# Patient Record
Sex: MALE | Race: WHITE | URBAN - METROPOLITAN AREA
[De-identification: names, ages, dates, MRNs, and addresses within clinical notes are randomized per-mention and may not be internally consistent; named-entity substitution may affect disease eponyms.]

---

## 2018-02-21 PROBLEM — F20.9 SCHIZOPHRENIA (H): Status: ACTIVE | Noted: 2018-02-21

## 2018-02-21 PROBLEM — I95.1 POSTURAL HYPOTENSION: Status: ACTIVE | Noted: 2018-02-21

## 2018-02-21 PROBLEM — E27.40 ADRENAL INSUFFICIENCY (H): Status: ACTIVE | Noted: 2018-02-21

## 2018-02-21 PROBLEM — Z85.46 HISTORY OF PROSTATE CANCER: Status: ACTIVE | Noted: 2018-02-21

## 2018-02-21 PROBLEM — F03.A0 MILD DEMENTIA (H): Status: ACTIVE | Noted: 2018-02-21

## 2018-02-21 PROBLEM — G24.01 TARDIVE DYSKINESIA: Status: ACTIVE | Noted: 2018-02-21

## 2018-02-21 RX ORDER — MULTIVIT WITH MINERALS/LUTEIN
1000 TABLET ORAL EVERY MORNING
COMMUNITY

## 2018-02-21 RX ORDER — SERTRALINE HYDROCHLORIDE 100 MG/1
250 TABLET, FILM COATED ORAL AT BEDTIME
COMMUNITY

## 2018-02-21 RX ORDER — TETRABENAZINE 25 MG/1
50 TABLET ORAL 3 TIMES DAILY
COMMUNITY
End: 2018-02-25

## 2018-02-21 RX ORDER — FLUDROCORTISONE ACETATE 0.1 MG/1
0.1 TABLET ORAL DAILY
COMMUNITY

## 2018-02-21 RX ORDER — QUETIAPINE FUMARATE 100 MG/1
100 TABLET, FILM COATED ORAL AT BEDTIME
COMMUNITY

## 2018-02-21 RX ORDER — GABAPENTIN 600 MG/1
600 TABLET ORAL AT BEDTIME
COMMUNITY

## 2018-02-21 RX ORDER — CLONAZEPAM 0.5 MG/1
0.25 TABLET ORAL AT BEDTIME
COMMUNITY

## 2018-02-21 RX ORDER — ACETAMINOPHEN 325 MG/1
325-650 TABLET ORAL EVERY 4 HOURS PRN
COMMUNITY

## 2018-02-21 RX ORDER — TERAZOSIN 1 MG/1
1 CAPSULE ORAL AT BEDTIME
COMMUNITY

## 2018-02-22 ENCOUNTER — NURSING HOME VISIT (OUTPATIENT)
Dept: GERIATRICS | Facility: CLINIC | Age: 73
End: 2018-02-22

## 2018-02-22 VITALS — WEIGHT: 172 LBS

## 2018-02-22 DIAGNOSIS — K92.1 HEMATOCHEZIA: ICD-10-CM

## 2018-02-22 DIAGNOSIS — F20.9 SCHIZOPHRENIA, UNSPECIFIED TYPE (H): ICD-10-CM

## 2018-02-22 DIAGNOSIS — R53.81 PHYSICAL DECONDITIONING: ICD-10-CM

## 2018-02-22 DIAGNOSIS — R62.7 FTT (FAILURE TO THRIVE) IN ADULT: ICD-10-CM

## 2018-02-22 DIAGNOSIS — E27.40 ADRENAL INSUFFICIENCY (H): ICD-10-CM

## 2018-02-22 DIAGNOSIS — G24.01 TARDIVE DYSKINESIA: ICD-10-CM

## 2018-02-22 DIAGNOSIS — D64.89 ANEMIA DUE TO RADIATION: Primary | ICD-10-CM

## 2018-02-22 DIAGNOSIS — I95.1 ORTHOSTATIC HYPOTENSION: ICD-10-CM

## 2018-02-22 PROCEDURE — 99310 SBSQ NF CARE HIGH MDM 45: CPT | Performed by: NURSE PRACTITIONER

## 2018-02-22 NOTE — PROGRESS NOTES
[place] [facility]    PRIMARY CARE PROVIDER AND CLINIC:  No Ref-Primary, Physician     Chief Complaint   Patient presents with     Hospital F/U       HPI:    Ralph Wilcox is a 72 year old  (1945),admitted to the [facility] from [facility].  Hospital stay 2/10/2018 through 2/21/2018.  Admitted to this facility for  rehab, medical management and nursing care.  HPI information obtained from: facility chart records, facility staff and patient report.       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 S/p 3u pRBC on admission. S/p EGD and colonoscopy on 2/13: EGD with non-bleeding small ulcers and colonoscopy with radiation proctopathy, s/p Sigmoidoscopy aid RFA for slow oozing angioectasis. Pt continues to have intermittent bloody stools since the RFA and a steady decline in his hgb to 7.7 and was prophylactically transfused with 1 u of pRBC with appropriate response. On discharge hgb is 9.5. GI contacted one day  prior to discharge about intermitent bloody stools and steady decline in hgb aid suggested slow bleeding is expected and that pt may require out-patient blood transfusions as needed until he follows up with then in 4 weeks for possible Sigmoidoscopy with RFA. On discharge pt tolerating regular diet and no c/a of bloody stools for 24 hrs.  - hgb chick in 1 week at TCU and transfusion of pRBC if '7  - follow up with PCP in 1 week  - follow up with GI in 4 weeks as stated above    2)Failurå to Thrive: pressented with c/o of inability to independatly preform ADLs, Progressively worsening self cares noted by HHN. Also noted prior  concerns of worsening dementia per chart review. Pt is decisional at this point and would like to return home but amenable to acute  rehab. On discharge pt endorsed good appetite and increased mobility.  - could benefit from IT evaluation for dementia by PCP  Currently being discharged to [facility] for acute rehab    3) Orthostatic hypotension with Chronic Dizziness: pt continues to have chronic dizziness leading to instability and repeated falls at home. During his hospital stay he had variable BP ranging from SBP in low 1ß0s to 150s. All his antihypertensive medications were held and recommend discontinuing them indefinitely. Pt is able to walk with a walker and requires one on one assit  - discontinue chlorthalidone indefinitely  - discontinue tamsulosin indefinitely and start prazosin as it has lower  insidents of hypotension  - cont previous FLUDROCORTISONE 0.1 mg qam    4)Tardivå dyskinesia: n admission c/a worsening dysarthria over the  past 6 months, confirmed by brother present in the exam room. Admission neuroexam was negative for any abnormalities. Pt is on home terbanefine for tardivdyskinesia. Unclear if pt takes his medication daily given FT. Endorses improvement of sx on discharge. It would be imperative for pt to receive his daily medication as prescribed.    5) Social problems: pt lives at home alone and may not have a running water and functional toilet, unclear if home is safe for inhabitation.  is repeatedly endorsed about going home after a short-term rehab and not amenable to living in a long term facility. SW recommended [place] , inspect the home and of fere resources to assist with cleanup and repair.    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  Hematochezia  As noted above  No further bloody stools since admission per patient report  Last hemoglobin levels as noted below  Follow-up as noted above  Vital signs have remained stable    FTT (failure to thrive) in adult  As noted above  Wt Readings from Last 4 Encounters:   02/22/18 172 lb (78 kg)     Pan  dyskinesia  Chronic  Terbinafine restarted inpatient  Patient denies over exaggerated symptoms of tardive dyskinesia at this time    Orthostatic hypotension  Blood pressures have remained stable since arrival to facility    Schizophrenia, unspecified type (H)  Chronic  On regimen of Zoloft Seroquel and Klonopin  No behaviors noted since arrival to facility    Adrenal insufficiency (H)  Chronic  Continues on regimen of Florinef    Physical deconditioning  Secondary to hospitalization and above-noted diagnoses  PT/OT following    CODE STATUS/ADVANCE DIRECTIVES DISCUSSION:   DNR / DNI  Patient's living condition: lives alone    ALLERGIES:No known allergies  PAST MEDICAL HISTORY:  has no past medical history on file.  PAST SURGICAL HISTORY:  has no past surgical history on file.  FAMILY HISTORY: family history is not on file.  SOCIAL HISTORY:      Post Discharge Medication Reconciliation Status: discharge medications reconciled, continue medications without change.  Current Outpatient Prescriptions   Medication Sig Dispense Refill     acetaminophen (TYLENOL) 325 MG tablet Take 325-650 mg by mouth every 4 hours as needed       clonazePAM (KLONOPIN) 0.5 MG tablet Take 0.25 mg by mouth At Bedtime       fludrocortisone (FLORINEF) 0.1 MG tablet Take 0.1 mg by mouth daily       gabapentin (NEURONTIN) 600 MG tablet Take 600 mg by mouth At Bedtime       QUEtiapine (SEROQUEL) 100 MG tablet Take 100 mg by mouth At Bedtime       terazosin (HYTRIN) 1 MG capsule Take 1 mg by mouth At Bedtime       tetrabenazine (XENAZINE) 25 MG tablet Take 50 mg by mouth 3 times daily       vitamin E (TOCOPHEROL) 1000 UNIT capsule Take 1,000 Units by mouth every morning       sertraline (ZOLOFT) 100 MG tablet Take 250 mg by mouth At Bedtime         ROS:  10 point ROS of systems including Constitutional, Eyes, Respiratory, Cardiovascular, Gastroenterology, Genitourinary, Integumentary, Muscularskeletal, Psychiatric were all negative except for  pertinent positives noted in my HPI.    Exam:  Wt 172 lb (78 kg)  GENERAL APPEARANCE:  Alert, in no distress, oriented, thin, cooperative  ENT:  Mouth and posterior oropharynx normal, moist mucous membranes, [place], edentulous  EYES:  EOM, conjunctivae, lids, pupils and irises normal, wears glasses  NECK:  No adenopathy,masses or thyromegaly  RESP:  respiratory effort and palpation of chest normal, lungs clear to auscultation , no respiratory distress  CV:  Palpation and auscultation of heart done , regular rate and rhythm, no murmur, rub, or gallop, no edema, +2 pedal pulses  ABDOMEN:  normal bowel sounds, soft, nontender, no hepatosplenomegaly or other masses  M/S:   Gait and station abnormal - ANTONIETA 2/2 patient beingin bed  Digits and nails normal  SKIN:  Palpation of skin and subcutaneous tissue baseline, Inspection of skin and subcutaneous tissue numerous areas on bruising throughout extremities, skin thin and dry  NEURO:   Cranial nerves 2-12 are normal tested and grossly at patient's baseline  PSYCH:  oriented X 3, insight and judgement impaired, affect and mood normal, may be slightly forgetful - doesn't say much during conversation - unsure of baseline    Lab/Diagnostic data:  No recent Labs       ASSESSMENT/PLAN:   Diagnosis Comments   1. 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 FTT (failure to thrive) in adult   dietary involvement  We will notice for discharge planning   3. Tardive dyskinesia   chronic  Stable on current regimen; continue medications as currently ordered.   4. Orthostatic hypotension   will continue to monitor while in facility  Stable without medical intervention   5. Adrenal insufficiency (H)   Stable on current regimen; continue medications as currently ordered.   6. Schizophrenia, unspecified type (H)   Stable on current regimen; continue medications  as currently ordered.   7. Physical deconditioning   PT/OT eval/treat advanced per their recommendations     Electronically signed by:  CARMELLA Rojas CNP

## 2018-02-22 NOTE — LETTER
2/22/2018        RE: Ralph Wilcox  [address] [address]        [place] [facility]    PRIMARY CARE PROVIDER AND CLINIC:  No Ref-Primary, Physician     Chief Complaint   Patient presents with     Hospital F/U       HPI:    Ralph Wilcox is a 72 year old  (1945),admitted to the [facility] from [facility].  Hospital stay 2/10/2018 through 2/21/2018.  Admitted to this facility for  rehab, medical management and nursing care.  HPI information obtained from: facility chart records, facility staff and patient report.       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 S/p 3u pRBC on admission. S/p EGD and colonoscopy on 2/13: EGD with non-bleeding small ulcers and colonoscopy with radiation proctopathy, s/p Sigmoidoscopy aid RFA for slow oozing angioectasis. Pt continues to have intermittent bloody stools since the RFA and a steady decline in his hgb to 7.7 and was prophylactically transfused with 1 u of pRBC with appropriate response. On discharge hgb is 9.5. GI contacted one day  prior to discharge about intermitent bloody stools and steady decline in hgb aid suggested slow bleeding is expected and that pt may require out-patient blood transfusions as needed until he follows up with then in 4 weeks for possible Sigmoidoscopy with RFA. On discharge pt tolerating regular diet and no c/a of bloody stools for 24 hrs.  - hgb chick in 1 week at TCU and transfusion of pRBC if '7  - follow up with PCP in 1 week  - follow up with GI in 4 weeks as stated above    2)Failurå to Thrive: pressented with c/o of inability to independatly preform ADLs, Progressively worsening self cares noted by HHN. Also noted prior  concerns of worsening dementia per  chart review. Pt is decisional at this point and would like to return home but amenable to acute rehab. On discharge pt endorsed good appetite and increased mobility.  - could benefit from IT evaluation for dementia by PCP  Currently being discharged to [facility] for acute rehab    3) Orthostatic hypotension with Chronic Dizziness: pt continues to have chronic dizziness leading to instability and repeated falls at home. During his hospital stay he had variable BP ranging from SBP in low 1ß0s to 150s. All his antihypertensive medications were held and recommend discontinuing them indefinitely. Pt is able to walk with a walker and requires one on one assit  - discontinue chlorthalidone indefinitely  - discontinue tamsulosin indefinitely and start prazosin as it has lower  insidents of hypotension  - cont previous FLUDROCORTISONE 0.1 mg qam    4)Tardivå dyskinesia: n admission c/a worsening dysarthria over the  past 6 months, confirmed by brother present in the exam room. Admission neuroexam was negative for any abnormalities. Pt is on home terbanefine for tardivdyskinesia. Unclear if pt takes his medication daily given FT. Endorses improvement of sx on discharge. It would be imperative for pt to receive his daily medication as prescribed.    5) Social problems: pt lives at home alone and may not have a running water and functional toilet, unclear if home is safe for inhabitation.  is repeatedly endorsed about going home after a short-term rehab and not amenable to living in a long term facility. [place] recommended [place] , inspect the home and of fere resources to assist with cleanup and repair.    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  Hematochezia  As noted above  No further bloody stools since admission per patient report  Last hemoglobin levels as noted below  Follow-up as noted above  Vital signs have remained stable    FTT (failure to thrive) in  adult  As noted above  Wt Readings from Last 4 Encounters:   02/22/18 172 lb (78 kg)     Tardive dyskinesia  Chronic  Terbinafine restarted inpatient  Patient denies over exaggerated symptoms of tardive dyskinesia at this time    Orthostatic hypotension  Blood pressures have remained stable since arrival to facility    Schizophrenia, unspecified type (H)  Chronic  On regimen of Zoloft Seroquel and Klonopin  No behaviors noted since arrival to facility    Adrenal insufficiency (H)  Chronic  Continues on regimen of Florinef    Physical deconditioning  Secondary to hospitalization and above-noted diagnoses  PT/OT following    CODE STATUS/ADVANCE DIRECTIVES DISCUSSION:   DNR / DNI  Patient's living condition: lives alone    ALLERGIES:No known allergies  PAST MEDICAL HISTORY:  has no past medical history on file.  PAST SURGICAL HISTORY:  has no past surgical history on file.  FAMILY HISTORY: family history is not on file.  SOCIAL HISTORY:      Post Discharge Medication Reconciliation Status: discharge medications reconciled, continue medications without change.  Current Outpatient Prescriptions   Medication Sig Dispense Refill     acetaminophen (TYLENOL) 325 MG tablet Take 325-650 mg by mouth every 4 hours as needed       clonazePAM (KLONOPIN) 0.5 MG tablet Take 0.25 mg by mouth At Bedtime       fludrocortisone (FLORINEF) 0.1 MG tablet Take 0.1 mg by mouth daily       gabapentin (NEURONTIN) 600 MG tablet Take 600 mg by mouth At Bedtime       QUEtiapine (SEROQUEL) 100 MG tablet Take 100 mg by mouth At Bedtime       terazosin (HYTRIN) 1 MG capsule Take 1 mg by mouth At Bedtime       tetrabenazine (XENAZINE) 25 MG tablet Take 50 mg by mouth 3 times daily       vitamin E (TOCOPHEROL) 1000 UNIT capsule Take 1,000 Units by mouth every morning       sertraline (ZOLOFT) 100 MG tablet Take 250 mg by mouth At Bedtime         ROS:  10 point ROS of systems including Constitutional, Eyes, Respiratory, Cardiovascular,  Gastroenterology, Genitourinary, Integumentary, Muscularskeletal, Psychiatric were all negative except for pertinent positives noted in my HPI.    Exam:  Wt 172 lb (78 kg)  GENERAL APPEARANCE:  Alert, in no distress, oriented, thin, cooperative  ENT:  Mouth and posterior oropharynx normal, moist mucous membranes, [place], edentulous  EYES:  EOM, conjunctivae, lids, pupils and irises normal, wears glasses  NECK:  No adenopathy,masses or thyromegaly  RESP:  respiratory effort and palpation of chest normal, lungs clear to auscultation , no respiratory distress  CV:  Palpation and auscultation of heart done , regular rate and rhythm, no murmur, rub, or gallop, no edema, +2 pedal pulses  ABDOMEN:  normal bowel sounds, soft, nontender, no hepatosplenomegaly or other masses  M/S:   Gait and station abnormal - ANTONIETA 2/2 patient beingin bed  Digits and nails normal  SKIN:  Palpation of skin and subcutaneous tissue baseline, Inspection of skin and subcutaneous tissue numerous areas on bruising throughout extremities, skin thin and dry  NEURO:   Cranial nerves 2-12 are normal tested and grossly at patient's baseline  PSYCH:  oriented X 3, insight and judgement impaired, affect and mood normal, may be slightly forgetful - doesn't say much during conversation - unsure of baseline    Lab/Diagnostic data:  No recent Labs       ASSESSMENT/PLAN:   Diagnosis Comments   1. 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 FTT (failure to thrive) in adult   dietary involvement  We will notice for discharge planning   3. Tardive dyskinesia   chronic  Stable on current regimen; continue medications as currently ordered.   4. Orthostatic hypotension   will continue to monitor while in facility  Stable without medical intervention   5. Adrenal insufficiency (H)   Stable on current regimen; continue medications as  currently ordered.   6. Schizophrenia, unspecified type (H)   Stable on current regimen; continue medications as currently ordered.   7. Physical deconditioning   PT/OT eval/treat advanced per their recommendations     Electronically signed by:  CARMELLA Rojas CNP                    Sincerely,        CARMELLA Rojas CNP

## 2018-02-25 VITALS
SYSTOLIC BLOOD PRESSURE: 134 MMHG | OXYGEN SATURATION: 97 % | TEMPERATURE: 97.9 F | DIASTOLIC BLOOD PRESSURE: 72 MMHG | WEIGHT: 172 LBS | RESPIRATION RATE: 18 BRPM | HEART RATE: 79 BPM

## 2018-02-25 NOTE — PROGRESS NOTES
"[place] [facility]  INITIAL VISIT NOTE  February 26, 2018    PRIMARY CARE PROVIDER AND CLINIC:  No Ref-Primary, Physician     Chief Complaint   Patient presents with     Hospital F/U       HPI:    Ralph Wilcox is a 72 year old  (1945) male who [facility] February 26, 2018 for an initial visit. Medical history is notable for schizophrenia, tardive dyskinesia, orthostatic hypotension and chronic dizziness. He was hospitalized at the [facility] from 2/10/18 to 2/21/18 where he presented with hematochezia, dizziness and weakness and was found to have a lower GI bleed and anemia (Hgb 6.8 from 9.3 one month prior and baseline 12-14) for which he is s/p 3 units PRBCs. He underwent a colonoscopy (2/13/18) with multiple non-bleeding colonic angioectasias consistent with radiation proctopathy. 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 He then had a flex sigmoidoscopy (2/14/18) with angioectasis with oozing in the rectum s/p RFA.  He continued to have intermittent bloody stools and Hgb drifted to 7.7 and he received a 4th unit of PRBCs. Recommendation for repeat flex ig in 4 weeks for retreatment and additional PRBC transfusion for Hgb <7. His PTA chlorthalidone, tamsulosin and potassium were discontinued, PTA gabapentin was changed from morning to bedtime dosing and he is new on prazosin (though discharge med list still has tamsulosin).  He was admitted to this facility for medical management and rehab.     Today, Mr. Wilcox is seen on a break between therapies. Reports some intermittent rectal bleeding he describes as \"small clots\" - no carlie red blood. No abdominal pain. No dizziness or lightheadedness. No chest pain. Working with therapies.     CODE STATUS:   DNR / DNI    ALLERGIES:     Allergies   Allergen Reactions     No Known Allergies        PAST MEDICAL HISTORY:   schizophrenia, tardive dyskinesia and chronic " "dizziness    PAST SURGICAL HISTORY:   No past surgical history on file.    FAMILY HISTORY:   Reviewed and non-contributory    SOCIAL HISTORY:   Lives alone     MEDICATIONS:  Current Outpatient Prescriptions   Medication Sig Dispense Refill     acetaminophen (TYLENOL) 325 MG tablet Take 325-650 mg by mouth every 4 hours as needed       clonazePAM (KLONOPIN) 0.5 MG tablet Take 0.25 mg by mouth At Bedtime       fludrocortisone (FLORINEF) 0.1 MG tablet Take 0.1 mg by mouth daily       gabapentin (NEURONTIN) 600 MG tablet Take 600 mg by mouth At Bedtime       QUEtiapine (SEROQUEL) 100 MG tablet Take 100 mg by mouth At Bedtime       terazosin (HYTRIN) 1 MG capsule Take 1 mg by mouth At Bedtime       vitamin E (TOCOPHEROL) 1000 UNIT capsule Take 1,000 Units by mouth every morning       sertraline (ZOLOFT) 100 MG tablet Take 250 mg by mouth At Bedtime         Post Discharge Medication Reconciliation Status: medication reconcilation previously completed during another office visit.    ROS:  10 point ROS neg other than the symptoms noted above in the HPI.    PHYSICAL EXAM:  /72  Pulse 79  Temp 97.9  F (36.6  C)  Resp 18  Wt 172 lb (78 kg)  SpO2 97%  Gen: sitting in wheelchair, alert, cooperative and in no acute distress  HEENT: normocephalic; oropharynx clear; thyroid not enlarged  Card: RRR, S1, S2, no murmurs  Resp: lungs clear to auscultation bilaterally  GI: abdomen soft, not-tender  MSK: normal muscle tone, no LE edema  Neuro: CX II-XII grossly in tact; ROM in all four extremities grossly in tact; + tardive dyskinsia   Psych: alert and oriented x3; normal affect    LABORATORY/IMAGING DATA:  Reviewed as per Epic    ASSESSMENT/PLAN:    GI Bleed s/p RFP for Angioectasis/Radiation Proctopathy (2/14/18)  Reports some intermittent rectal bleeding he describes as \"small clots\" - no carlie red blood. No abdominal pain.   -- repeat flex sig in mid-March for repeat treatment  -- follow Hgb (see below)    Acute Blood Loss " Anemia  Secondary to above. No evidence of ongoing bleeding. Received total of 4 units PRBCs with jodie Hgb 6.8. Hgb was 9.5 at hospital discharge. No dizziness or lightheadedness  -- repeat Hgb to ensure stability; will need PRBC transfusion for Hgb <7    Orthostatic Hypotension  Chronic. Discharge summary states PTA tamsulosin changed to prazosin, but he was discharged on tamsulosin. PTA chlorthalidone was discontinued. No SBP trend available for review (only one value in Matrix)  -- continues on fludrocortisone 0.1 mg daily  -- follow clinically     Schizophrenia  Tardive Dyskinesia  Appears compensated.   -- continues on clonazepam 0.25 mg qhs, quetiapine 100 mg qhs and sertraline 250 mg qhs  -- supportive cares    BPH  -- continues on terazosin 1 mg qhs    Physical Deconditioning  Frequent Falls  Failure to Thrive  In setting of hospitalization and underlying medical conditions  -- ongoing PT/OT    Electronically signed by  Stephanie Coronel MD

## 2018-02-26 ENCOUNTER — NURSING HOME VISIT (OUTPATIENT)
Dept: GERIATRICS | Facility: CLINIC | Age: 73
End: 2018-02-26

## 2018-02-26 DIAGNOSIS — R29.6 FALLS FREQUENTLY: ICD-10-CM

## 2018-02-26 DIAGNOSIS — R53.81 PHYSICAL DECONDITIONING: ICD-10-CM

## 2018-02-26 DIAGNOSIS — R62.7 ADULT FAILURE TO THRIVE: ICD-10-CM

## 2018-02-26 DIAGNOSIS — F20.9 SCHIZOPHRENIA, UNSPECIFIED TYPE (H): ICD-10-CM

## 2018-02-26 DIAGNOSIS — I95.1 ORTHOSTATIC HYPOTENSION: ICD-10-CM

## 2018-02-26 DIAGNOSIS — K92.2 GASTROINTESTINAL HEMORRHAGE, UNSPECIFIED GASTROINTESTINAL HEMORRHAGE TYPE: Primary | ICD-10-CM

## 2018-02-26 DIAGNOSIS — D62 ANEMIA DUE TO BLOOD LOSS, ACUTE: ICD-10-CM

## 2018-02-26 DIAGNOSIS — G24.01 TARDIVE DYSKINESIA: ICD-10-CM

## 2018-02-26 PROCEDURE — 99306 1ST NF CARE HIGH MDM 50: CPT | Performed by: INTERNAL MEDICINE

## 2018-02-28 ENCOUNTER — TRANSFERRED RECORDS (OUTPATIENT)
Dept: HEALTH INFORMATION MANAGEMENT | Facility: CLINIC | Age: 73
End: 2018-02-28

## 2018-02-28 LAB
DIFFERENTIAL: ABNORMAL
ERYTHROCYTE [DISTWIDTH] IN BLOOD BY AUTOMATED COUNT: 16.2 % (ref 11–15)
HCT VFR BLD AUTO: 29.6 % (ref 39–51)
HEMOGLOBIN: 9.3 G/DL (ref 13.4–17.5)
MCV RBC AUTO: 81.1 FL (ref 80–100)
PLATELET # BLD AUTO: 333 K/CMM (ref 140–450)
RBC # BLD AUTO: 3.65 M/CMM (ref 4.2–5.9)
WBC # BLD AUTO: 5.3 K/CMM (ref 3.8–11)

## 2018-03-01 PROBLEM — D64.89 ANEMIA DUE TO RADIATION: Status: ACTIVE | Noted: 2018-03-01

## 2018-03-01 PROBLEM — R62.7 FTT (FAILURE TO THRIVE) IN ADULT: Status: ACTIVE | Noted: 2018-03-01

## 2018-03-08 ENCOUNTER — NURSING HOME VISIT (OUTPATIENT)
Dept: GERIATRICS | Facility: CLINIC | Age: 73
End: 2018-03-08

## 2018-03-08 VITALS
WEIGHT: 172.5 LBS | DIASTOLIC BLOOD PRESSURE: 68 MMHG | OXYGEN SATURATION: 98 % | HEART RATE: 78 BPM | RESPIRATION RATE: 18 BRPM | SYSTOLIC BLOOD PRESSURE: 108 MMHG | TEMPERATURE: 97.7 F

## 2018-03-08 DIAGNOSIS — R53.81 PHYSICAL DECONDITIONING: ICD-10-CM

## 2018-03-08 DIAGNOSIS — I95.1 ORTHOSTATIC HYPOTENSION: ICD-10-CM

## 2018-03-08 DIAGNOSIS — E27.40 ADRENAL INSUFFICIENCY (H): ICD-10-CM

## 2018-03-08 DIAGNOSIS — F20.9 SCHIZOPHRENIA, UNSPECIFIED TYPE (H): ICD-10-CM

## 2018-03-08 DIAGNOSIS — D62 ANEMIA DUE TO BLOOD LOSS, ACUTE: Primary | ICD-10-CM

## 2018-03-08 DIAGNOSIS — G24.01 TARDIVE DYSKINESIA: ICD-10-CM

## 2018-03-08 DIAGNOSIS — R62.7 FTT (FAILURE TO THRIVE) IN ADULT: ICD-10-CM

## 2018-03-08 DIAGNOSIS — K92.1 HEMATOCHEZIA: ICD-10-CM

## 2018-03-08 PROCEDURE — 99309 SBSQ NF CARE MODERATE MDM 30: CPT | Performed by: NURSE PRACTITIONER

## 2018-03-08 NOTE — PROGRESS NOTES
[place] [facility]    Chief Complaint   Patient presents with     RECHECK       HPI:    Ralph Wilcox is a 72 year old  (1945), who is being seen today for an episodic care visit at [facility] [place].  HPI information obtained from: facility chart records, facility staff and patient report.    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 S/p 3u pRBC on admission. S/p EGD and colonoscopy on 2/13: EGD with non-bleeding small ulcers and colonoscopy with radiation proctopathy, s/p Sigmoidoscopy aid RFA for slow oozing angioectasis. Pt continues to have intermittent bloody stools since the RFA and a steady decline in his hgb to 7.7 and was prophylactically transfused with 1 u of pRBC with appropriate response. On discharge hgb is 9.5. GI contacted one day  prior to discharge about intermitent bloody stools and steady decline in hgb aid suggested slow bleeding is expected and that pt may require out-patient blood transfusions as needed until he follows up with then in 4 weeks for possible Sigmoidoscopy with RFA. On discharge pt tolerating regular diet and no c/a of bloody stools for 24 hrs.  - hgb chick in 1 week at TCU and transfusion of pRBC if '7  - follow up with PCP in 1 week  - follow up with GI in 4 weeks as stated above  No further bloody stools since admission per patient report  Last hemoglobin levels as noted below  Follow-up as noted above  Vital signs have remained stable   Ref. Range 2/28/2018 00:00   Hemoglobin Latest Ref Range: 13.4 - 17.5 g/dL 9.3 (L)       FTT (failure to thrive) in adult  Failur? to Thrive: pressented with c/o of inability to independatly preform ADLs, Progressively worsening self cares noted by HHN. Also noted prior  concerns of worsening  "dementia per chart review. Pt is decisional at this point and would like to return home but amenable to acute rehab. On discharge pt endorsed good appetite and increased mobility.  - could benefit from IT evaluation for dementia by PCP  Currently being discharged to [facility] for acute rehab  Wt Readings from Last 4 Encounters:   03/08/18 172 lb 8 oz (78.2 kg)   02/25/18 172 lb (78 kg)   02/22/18 172 lb (78 kg)   Weight has remained stable    Tardive dyskinesia  Chronic  Terbinafine restarted inpatient  Patient denies over exaggerated symptoms of tardive dyskinesia since admission with current regimen    Orthostatic hypotension  Blood pressures have remained stable since arrival to facility    Schizophrenia, unspecified type (H)  Chronic  On regimen of Zoloft Seroquel and Klonopin  No behaviors noted since arrival to facility  PHQ9: 08/27  Denies symptoms of depression today  BIMs: 15/15    Adrenal insufficiency (H)  Chronic  Continues on regimen of Florinef    Physical deconditioning  Secondary to hospitalization and above-noted diagnoses  PT following:  \"Attended pt's care conference and discussed c pt D/C planning, and current recommendation of assisted living.  Pt will continue c therapy to continue to improve pt's gait, balance and functional mobility\".    Ambulating with rolling walker 150 feet ×2, 15 feet ×2 with caregiver assistance    Caregiver assistance with transferring from bed to wheelchair    Independent with bed mobility    NuStep L3 resistance ×15 minutes with bilateral lower extremities and bilateral upper extremities    Seated exercises in wheelchair with 3 pound weights and GTB, 2 sets of 20 with bilateral lower extremities    Sit to stand 2 sets of 10 with standby assist    Patient has been seen for 5/5 skilled PT treatments and has demonstrated ability to ambulate 300 feet with caregiver assist, perform transfers with caregiver assist in bed mobility with independent.  Patient has " demonstrated a retro-lean at times during ambulation which patient intermittently requires assistance to correct.  Patient will continue to benefit from skilled PT to improve patient's functional mobility lower extremity strength and gait      ALLERGIES: No known allergies  Past Medical, Surgical, Family and Social History reviewed and updated in [facility].    Current Outpatient Prescriptions   Medication Sig Dispense Refill     acetaminophen (TYLENOL) 325 MG tablet Take 325-650 mg by mouth every 4 hours as needed       clonazePAM (KLONOPIN) 0.5 MG tablet Take 0.25 mg by mouth At Bedtime       fludrocortisone (FLORINEF) 0.1 MG tablet Take 0.1 mg by mouth daily       gabapentin (NEURONTIN) 600 MG tablet Take 600 mg by mouth At Bedtime       QUEtiapine (SEROQUEL) 100 MG tablet Take 100 mg by mouth At Bedtime       terazosin (HYTRIN) 1 MG capsule Take 1 mg by mouth At Bedtime       vitamin E (TOCOPHEROL) 1000 UNIT capsule Take 1,000 Units by mouth every morning       sertraline (ZOLOFT) 100 MG tablet Take 250 mg by mouth At Bedtime       Medications reviewed:  Medications reconciled to facility chart and changes were made to reflect current medications as identified as above med list. Below are the changes that were made:   Medications stopped since last EPIC medication reconciliation:   There are no discontinued medications.    Medications started since last [facility] medication reconciliation:  No orders of the defined types were placed in this encounter.    REVIEW OF SYSTEMS:  4 point ROS including Respiratory, CV, GI and , other than that noted in the HPI,  is negative    Physical Exam:  /68  Pulse 78  Temp 97.7  F (36.5  C)  Resp 18  Wt 172 lb 8 oz (78.2 kg)  SpO2 98%  BP Readin/47, 102/54, 134/72         HR:  66-79  GENERAL APPEARANCE:  Alert, in no distress, oriented, thin, cooperative  ENT:  Mouth and posterior oropharynx normal, moist mucous membranes, [place], edentulous  EYES:  EOM, conjunctivae, lids,  pupils and irises normal, wears glasses  NECK:  No adenopathy,masses or thyromegaly  RESP:  respiratory effort and palpation of chest normal, lungs clear to auscultation, no respiratory distress  CV:  Palpation and auscultation of heart done, regular rate and rhythm, no murmur, rub, or gallop, no edema, +2 pedal pulses  ABDOMEN:  normal bowel sounds, soft, nontender, no hepatosplenomegaly or other masses  M/S:   Gait and station abnormal - ANTONIETA 2/2 patient being in bed; Digits and nails normal  SKIN:  Palpation of skin and subcutaneous tissue baseline, Inspection of skin and subcutaneous tissue numerous areas on bruising throughout extremities - improved since last weeks visit, skin thin and dry  NEURO:   Cranial nerves 2-12 are normal tested and grossly at patient's baseline  PSYCH:  oriented X 3, insight and judgement impaired, affect and mood normal, may be slightly forgetful - doesn't say much during conversation - this seems to be patient baseline      Recent Labs:   CBC RESULTS:   Recent Labs   Lab Test 02/28/18   WBC  5.3   RBC  3.65*   HGB  9.3*   HCT  29.6*   MCV  81.1   RDW  16.2*   PLT  333         Assessment/Plan:   Diagnosis Comments   1. 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 FTT (failure to thrive) in adult   dietary involvement  We will monitor for discharge planning   3. Tardive dyskinesia   chronic  Stable on current regimen; continue medications as currently ordered.   4. Orthostatic hypotension   will continue to monitor while in facility  Stable without medical intervention   5. Adrenal insufficiency (H)   Stable on current regimen; continue medications as currently ordered.   6. Schizophrenia, unspecified type (H)   Stable on current regimen; continue medications as currently ordered.   7. Physical deconditioning   PT/OT - adv per their  recommendations     Electronically signed by  CARMELLA Rojas CNP

## 2018-03-08 NOTE — LETTER
3/8/2018        RE: Ralph Wilcox  [address] [address]        [place] [facility]    Chief Complaint   Patient presents with     RECHECK       HPI:    Ralph Wilcox is a 72 year old  (1945), who is being seen today for an episodic care visit at [facility].  HPI information obtained from: facility chart records, facility staff and patient report.    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 S/p 3u pRBC on admission. S/p EGD and colonoscopy on 2/13: EGD with non-bleeding small ulcers and colonoscopy with radiation proctopathy, s/p Sigmoidoscopy aid RFA for slow oozing angioectasis. Pt continues to have intermittent bloody stools since the RFA and a steady decline in his hgb to 7.7 and was prophylactically transfused with 1 u of pRBC with appropriate response. On discharge hgb is 9.5. GI contacted one day  prior to discharge about intermitent bloody stools and steady decline in hgb aid suggested slow bleeding is expected and that pt may require out-patient blood transfusions as needed until he follows up with then in 4 weeks for possible Sigmoidoscopy with RFA. On discharge pt tolerating regular diet and no c/a of bloody stools for 24 hrs.  - hgb chick in 1 week at TCU and transfusion of pRBC if '7  - follow up with PCP in 1 week  - follow up with GI in 4 weeks as stated above  No further bloody stools since admission per patient report  Last hemoglobin levels as noted below  Follow-up as noted above  Vital signs have remained stable   Ref. Range 2/28/2018 00:00   Hemoglobin Latest Ref Range: 13.4 - 17.5 g/dL 9.3 (L)       FTT (failure to thrive) in adult  Failur? to Thrive: pressented with c/o of inability to independatly preform  "ADLs, Progressively worsening self cares noted by HHN. Also noted prior  concerns of worsening dementia per chart review. Pt is decisional at this point and would like to return home but amenable to acute rehab. On discharge pt endorsed good appetite and increased mobility.  - could benefit from IT evaluation for dementia by PCP  Currently being discharged to [facility]CU for acute rehab  Wt Readings from Last 4 Encounters:   03/08/18 172 lb 8 oz (78.2 kg)   02/25/18 172 lb (78 kg)   02/22/18 172 lb (78 kg)   Weight has remained stable    Tardive dyskinesia  Chronic  Terbinafine restarted inpatient  Patient denies over exaggerated symptoms of tardive dyskinesia since admission with current regimen    Orthostatic hypotension  Blood pressures have remained stable since arrival to facility    Schizophrenia, unspecified type (H)  Chronic  On regimen of Zoloft Seroquel and Klonopin  No behaviors noted since arrival to facility  PHQ9: 08/27  Denies symptoms of depression today  BIMs: 15/15    Adrenal insufficiency (H)  Chronic  Continues on regimen of Florinef    Physical deconditioning  Secondary to hospitalization and above-noted diagnoses  PT following:  \"Attended pt's care conference and discussed c pt D/C planning, and current recommendation of assisted living.  Pt will continue c therapy to continue to improve pt's gait, balance and functional mobility\".    Ambulating with rolling walker 150 feet ×2, 15 feet ×2 with caregiver assistance    Caregiver assistance with transferring from bed to wheelchair    Independent with bed mobility    NuStep L3 resistance ×15 minutes with bilateral lower extremities and bilateral upper extremities    Seated exercises in wheelchair with 3 pound weights and GTB, 2 sets of 20 with bilateral lower extremities    Sit to stand 2 sets of 10 with standby assist    Patient has been seen for 5/5 skilled PT treatments and has demonstrated ability to ambulate 300 feet with caregiver " assist, perform transfers with caregiver assist in bed mobility with independent.  Patient has demonstrated a retro-lean at times during ambulation which patient intermittently requires assistance to correct.  Patient will continue to benefit from skilled PT to improve patient's functional mobility lower extremity strength and gait      ALLERGIES: No known allergies  Past Medical, Surgical, Family and Social History reviewed and updated in [facility].    Current Outpatient Prescriptions   Medication Sig Dispense Refill     acetaminophen (TYLENOL) 325 MG tablet Take 325-650 mg by mouth every 4 hours as needed       clonazePAM (KLONOPIN) 0.5 MG tablet Take 0.25 mg by mouth At Bedtime       fludrocortisone (FLORINEF) 0.1 MG tablet Take 0.1 mg by mouth daily       gabapentin (NEURONTIN) 600 MG tablet Take 600 mg by mouth At Bedtime       QUEtiapine (SEROQUEL) 100 MG tablet Take 100 mg by mouth At Bedtime       terazosin (HYTRIN) 1 MG capsule Take 1 mg by mouth At Bedtime       vitamin E (TOCOPHEROL) 1000 UNIT capsule Take 1,000 Units by mouth every morning       sertraline (ZOLOFT) 100 MG tablet Take 250 mg by mouth At Bedtime       Medications reviewed:  Medications reconciled to facility chart and changes were made to reflect current medications as identified as above med list. Below are the changes that were made:   Medications stopped since last EPIC medication reconciliation:   There are no discontinued medications.    Medications started since last [facility] medication reconciliation:  No orders of the defined types were placed in this encounter.    REVIEW OF SYSTEMS:  4 point ROS including Respiratory, CV, GI and , other than that noted in the HPI,  is negative    Physical Exam:  /68  Pulse 78  Temp 97.7  F (36.5  C)  Resp 18  Wt 172 lb 8 oz (78.2 kg)  SpO2 98%  BP Readin/47, 102/54, 134/72         HR:  66-79  GENERAL APPEARANCE:  Alert, in no distress, oriented, thin, cooperative  ENT:  Mouth and  posterior oropharynx normal, moist mucous membranes, [place], edentulous  EYES:  EOM, conjunctivae, lids, pupils and irises normal, wears glasses  NECK:  No adenopathy,masses or thyromegaly  RESP:  respiratory effort and palpation of chest normal, lungs clear to auscultation, no respiratory distress  CV:  Palpation and auscultation of heart done, regular rate and rhythm, no murmur, rub, or gallop, no edema, +2 pedal pulses  ABDOMEN:  normal bowel sounds, soft, nontender, no hepatosplenomegaly or other masses  M/S:   Gait and station abnormal - ANTONIETA 2/2 patient being in bed; Digits and nails normal  SKIN:  Palpation of skin and subcutaneous tissue baseline, Inspection of skin and subcutaneous tissue numerous areas on bruising throughout extremities - improved since last weeks visit, skin thin and dry  NEURO:   Cranial nerves 2-12 are normal tested and grossly at patient's baseline  PSYCH:  oriented X 3, insight and judgement impaired, affect and mood normal, may be slightly forgetful - doesn't say much during conversation - this seems to be patient baseline      Recent Labs:   CBC RESULTS:   Recent Labs   Lab Test 02/28/18   WBC  5.3   RBC  3.65*   HGB  9.3*   HCT  29.6*   MCV  81.1   RDW  16.2*   PLT  333         Assessment/Plan:   Diagnosis Comments   1. 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 FTT (failure to thrive) in adult   dietary involvement  We will monitor for discharge planning   3. Tardive dyskinesia   chronic  Stable on current regimen; continue medications as currently ordered.   4. Orthostatic hypotension   will continue to monitor while in facility  Stable without medical intervention   5. Adrenal insufficiency (H)   Stable on current regimen; continue medications as currently ordered.   6. Schizophrenia, unspecified type (H)   Stable on current regimen;  continue medications as currently ordered.   7. Physical deconditioning   PT/OT - adv per their recommendations     Electronically signed by  CARMELLA Rojas CNP                    Sincerely,        CARMELLA Rojas CNP

## 2018-03-09 ENCOUNTER — TRANSFERRED RECORDS (OUTPATIENT)
Dept: HEALTH INFORMATION MANAGEMENT | Facility: CLINIC | Age: 73
End: 2018-03-09

## 2018-03-09 LAB — HEMOGLOBIN: 9.2 G/DL (ref 13.4–17.5)

## 2018-03-14 VITALS
WEIGHT: 172.1 LBS | OXYGEN SATURATION: 98 % | HEIGHT: 74 IN | RESPIRATION RATE: 18 BRPM | BODY MASS INDEX: 22.09 KG/M2 | HEART RATE: 88 BPM | DIASTOLIC BLOOD PRESSURE: 64 MMHG | TEMPERATURE: 98.1 F | SYSTOLIC BLOOD PRESSURE: 112 MMHG

## 2018-03-15 ENCOUNTER — NURSING HOME VISIT (OUTPATIENT)
Dept: GERIATRICS | Facility: CLINIC | Age: 73
End: 2018-03-15

## 2018-03-15 DIAGNOSIS — E27.40 ADRENAL INSUFFICIENCY (H): ICD-10-CM

## 2018-03-15 DIAGNOSIS — R62.7 FTT (FAILURE TO THRIVE) IN ADULT: ICD-10-CM

## 2018-03-15 DIAGNOSIS — D64.89 ANEMIA DUE TO RADIATION: Primary | ICD-10-CM

## 2018-03-15 DIAGNOSIS — F20.9 SCHIZOPHRENIA, UNSPECIFIED TYPE (H): ICD-10-CM

## 2018-03-15 DIAGNOSIS — G24.01 TARDIVE DYSKINESIA: ICD-10-CM

## 2018-03-15 DIAGNOSIS — I95.1 ORTHOSTATIC HYPOTENSION: ICD-10-CM

## 2018-03-15 DIAGNOSIS — R53.81 PHYSICAL DECONDITIONING: ICD-10-CM

## 2018-03-15 DIAGNOSIS — K92.1 HEMATOCHEZIA: ICD-10-CM

## 2018-03-15 PROCEDURE — 99309 SBSQ NF CARE MODERATE MDM 30: CPT | Performed by: NURSE PRACTITIONER

## 2018-03-15 NOTE — LETTER
3/15/2018        RE: Ralph Wilcox  [address] [address]        [place] [facility]    Chief Complaint   Patient presents with     RECHECK       HPI:    Ralph Wilcox is a 72 year old  (1945), who is being seen today for an episodic care visit at [facility].  HPI information obtained from: facility chart records, facility staff and patient report.    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 S/p 3u pRBC on admission. S/p EGD and colonoscopy on 2/13: EGD with non-bleeding small ulcers and colonoscopy with radiation proctopathy, s/p Sigmoidoscopy aid RFA for slow oozing angioectasis. Pt continues to have intermittent bloody stools since the RFA and a steady decline in his hgb to 7.7 and was prophylactically transfused with 1 u of pRBC with appropriate response. On discharge hgb is 9.5. GI contacted one day  prior to discharge about intermitent bloody stools and steady decline in hgb aid suggested slow bleeding is expected and that pt may require out-patient blood transfusions as needed until he follows up with then in 4 weeks for possible Sigmoidoscopy with RFA. On discharge pt tolerating regular diet and no c/a of bloody stools for 24 hrs.  - hgb chick in 1 week at TCU and transfusion of pRBC if '7  - follow up with PCP in 1 week  - follow up with GI in 4 weeks as stated above  No further bloody stools since admission per patient report  Last hemoglobin levels as noted below  Follow-up as noted above  Vital signs have remained stable  Hemoglobin   Date Value Ref Range Status   03/09/2018 9.2 (L) 13.4 - 17.5 g/dL Final   02/28/2018 9.3 (L) 13.4 - 17.5 g/dL Final     FTT (failure to thrive) in adult  Failur? to Thrive: pressented with  "c/o of inability to independatly preform ADLs, Progressively worsening self cares noted by HHN. Also noted prior  concerns of worsening dementia per chart review. Pt is decisional at this point and would like to return home but amenable to acute rehab. On discharge pt endorsed good appetite and increased mobility.  - could benefit from IT evaluation for dementia by PCP  Currently being discharged to [facility] for acute rehab  Wt Readings from Last 4 Encounters:   03/14/18 172 lb 1.6 oz (78.1 kg)   03/08/18 172 lb 8 oz (78.2 kg)   02/25/18 172 lb (78 kg)   02/22/18 172 lb (78 kg)   Weight has remained stable    Tardive dyskinesia  Chronic  Terbinafine restarted inpatient  Patient denies over exaggerated symptoms of tardive dyskinesia since admission with current regimen    Orthostatic hypotension  Blood pressures have remained stable since arrival to facility    Schizophrenia, unspecified type (H)  Chronic  On regimen of Zoloft Seroquel and Klonopin  No behaviors noted since arrival to facility  PHQ9: 08/27  Denies symptoms of depression today  BIMs: 15/15    Adrenal insufficiency (H)  Chronic  Continues on regimen of Florinef    Physical deconditioning  Secondary to hospitalization and above-noted diagnoses  PT following:  \"Attended pt's care conference and discussed c pt D/C planning, and current recommendation of assisted living.  Pt will continue c therapy to continue to improve pt's gait, balance and functional mobility\".    Ambulating with rolling walker 150', 15' x2 with standby assistance    Independent with transferring from bed to wheelchair    Independent with bed mobility    NuStep L3 resistance x15 min c B LE and UE    Standing exercises in // bars, 1 set of 20 c B LE    ALLERGIES: No known allergies  Past Medical, Surgical, Family and Social History reviewed and updated in [facility].    Current Outpatient Prescriptions   Medication Sig Dispense Refill     acetaminophen (TYLENOL) 325 MG tablet " "Take 325-650 mg by mouth every 4 hours as needed       clonazePAM (KLONOPIN) 0.5 MG tablet Take 0.25 mg by mouth At Bedtime       fludrocortisone (FLORINEF) 0.1 MG tablet Take 0.1 mg by mouth daily       gabapentin (NEURONTIN) 600 MG tablet Take 600 mg by mouth At Bedtime       QUEtiapine (SEROQUEL) 100 MG tablet Take 100 mg by mouth At Bedtime       terazosin (HYTRIN) 1 MG capsule Take 1 mg by mouth At Bedtime       vitamin E (TOCOPHEROL) 1000 UNIT capsule Take 1,000 Units by mouth every morning       sertraline (ZOLOFT) 100 MG tablet Take 250 mg by mouth At Bedtime       Medications reviewed:  Medications reconciled to facility chart and changes were made to reflect current medications as identified as above med list. Below are the changes that were made:   Medications stopped since last EPIC medication reconciliation:   There are no discontinued medications.    Medications started since last [facility] medication reconciliation:  No orders of the defined types were placed in this encounter.    REVIEW OF SYSTEMS:  4 point ROS including Respiratory, CV, GI and , other than that noted in the HPI,  is negative    Physical Exam:  /64  Pulse 88  Temp 98.1  F (36.7  C)  Resp 18  Ht 6' 2\" (1.88 m)  Wt 172 lb 1.6 oz (78.1 kg)  SpO2 98%  BMI 22.1 kg/m2  BP Readin/71, 120/78, 108/68              HR:  66-88    GENERAL APPEARANCE:  Alert, in no distress, oriented, thin, cooperative, elderly male ambulating with use of front-wheeled walker  ENT:  Mouth and posterior oropharynx normal, moist mucous membranes, [place], edentulous  EYES:  EOM, conjunctivae, lids, pupils and irises normal, wears glasses  NECK:  No adenopathy, masses or thyromegaly  RESP:  respiratory effort and palpation of chest normal, lungs clear to auscultation, no respiratory distress  CV:  Palpation and auscultation of heart done, regular rate and rhythm, no murmur, rub, or gallop, no edema, +2 pedal pulses  ABDOMEN:  normal bowel sounds, soft, " nontender, no hepatosplenomegaly or other masses  M/S:   Gait and station -slow and steady with use of walker; Digits and nails normal  SKIN:  Palpation of skin and subcutaneous tissue baseline, Inspection of skin and subcutaneous tissue baseline  NEURO:   Cranial nerves 2-12 are normal tested and grossly at patient's baseline  PSYCH:  oriented X 3, insight and judgement impaired, affect and mood normal, may be slightly forgetful - doesn't say much at baseline    Recent Labs:   CBC RESULTS:   Recent Labs   Lab Test 02/28/18   WBC  5.3   RBC  3.65*   HGB  9.3*   HCT  29.6*   MCV  81.1   RDW  16.2*   PLT  333         Assessment/Plan:   Diagnosis Comments   1. 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 FTT (failure to thrive) in adult   dietary involvement  We will monitor for discharge planning   3. Tardive dyskinesia   chronic  Stable on current regimen; continue medications as currently ordered.   4. Orthostatic hypotension   will continue to monitor while in facility  Stable without medical intervention   5. Adrenal insufficiency (H)   Stable on current regimen; continue medications as currently ordered.   6. Schizophrenia, unspecified type (H)   Stable on current regimen; continue medications as currently ordered.   7. Physical deconditioning   PT/OT - adv per their recommendations       Electronically signed by  CARMELLA Rojas CNP                      Sincerely,        CARMELLA Rojas CNP

## 2018-03-15 NOTE — PROGRESS NOTES
[place] [facility]    Chief Complaint   Patient presents with     RECHECK       HPI:    Ralph Wilcox is a 72 year old  (1945), who is being seen today for an episodic care visit at [facility] [place].  HPI information obtained from: facility chart records, facility staff and patient report.    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 S/p 3u pRBC on admission. S/p EGD and colonoscopy on 2/13: EGD with non-bleeding small ulcers and colonoscopy with radiation proctopathy, s/p Sigmoidoscopy aid RFA for slow oozing angioectasis. Pt continues to have intermittent bloody stools since the RFA and a steady decline in his hgb to 7.7 and was prophylactically transfused with 1 u of pRBC with appropriate response. On discharge hgb is 9.5. GI contacted one day  prior to discharge about intermitent bloody stools and steady decline in hgb aid suggested slow bleeding is expected and that pt may require out-patient blood transfusions as needed until he follows up with then in 4 weeks for possible Sigmoidoscopy with RFA. On discharge pt tolerating regular diet and no c/a of bloody stools for 24 hrs.  - hgb chick in 1 week at TCU and transfusion of pRBC if '7  - follow up with PCP in 1 week  - follow up with GI in 4 weeks as stated above  No further bloody stools since admission per patient report  Last hemoglobin levels as noted below  Follow-up as noted above  Vital signs have remained stable  Hemoglobin   Date Value Ref Range Status   03/09/2018 9.2 (L) 13.4 - 17.5 g/dL Final   02/28/2018 9.3 (L) 13.4 - 17.5 g/dL Final     FTT (failure to thrive) in adult  Failur? to Thrive: pressented with c/o of inability to independatly preform ADLs, Progressively worsening self cares noted by HHN.  "Also noted prior  concerns of worsening dementia per chart review. Pt is decisional at this point and would like to return home but amenable to acute rehab. On discharge pt endorsed good appetite and increased mobility.  - could benefit from IT evaluation for dementia by PCP  Currently being discharged to [facility] for acute rehab  Wt Readings from Last 4 Encounters:   03/14/18 172 lb 1.6 oz (78.1 kg)   03/08/18 172 lb 8 oz (78.2 kg)   02/25/18 172 lb (78 kg)   02/22/18 172 lb (78 kg)   Weight has remained stable    Tardive dyskinesia  Chronic  Terbinafine restarted inpatient  Patient denies over exaggerated symptoms of tardive dyskinesia since admission with current regimen    Orthostatic hypotension  Blood pressures have remained stable since arrival to facility    Schizophrenia, unspecified type (H)  Chronic  On regimen of Zoloft Seroquel and Klonopin  No behaviors noted since arrival to facility  PHQ9: 08/27  Denies symptoms of depression today  BIMs: 15/15    Adrenal insufficiency (H)  Chronic  Continues on regimen of Florinef    Physical deconditioning  Secondary to hospitalization and above-noted diagnoses  PT following:  \"Attended pt's care conference and discussed c pt D/C planning, and current recommendation of assisted living.  Pt will continue c therapy to continue to improve pt's gait, balance and functional mobility\".    Ambulating with rolling walker 150', 15' x2 with standby assistance    Independent with transferring from bed to wheelchair    Independent with bed mobility    NuStep L3 resistance x15 min c B LE and UE    Standing exercises in // bars, 1 set of 20 c B LE    ALLERGIES: No known allergies  Past Medical, Surgical, Family and Social History reviewed and updated in [facility].    Current Outpatient Prescriptions   Medication Sig Dispense Refill     acetaminophen (TYLENOL) 325 MG tablet Take 325-650 mg by mouth every 4 hours as needed       clonazePAM (KLONOPIN) 0.5 MG tablet Take " "0.25 mg by mouth At Bedtime       fludrocortisone (FLORINEF) 0.1 MG tablet Take 0.1 mg by mouth daily       gabapentin (NEURONTIN) 600 MG tablet Take 600 mg by mouth At Bedtime       QUEtiapine (SEROQUEL) 100 MG tablet Take 100 mg by mouth At Bedtime       terazosin (HYTRIN) 1 MG capsule Take 1 mg by mouth At Bedtime       vitamin E (TOCOPHEROL) 1000 UNIT capsule Take 1,000 Units by mouth every morning       sertraline (ZOLOFT) 100 MG tablet Take 250 mg by mouth At Bedtime       Medications reviewed:  Medications reconciled to facility chart and changes were made to reflect current medications as identified as above med list. Below are the changes that were made:   Medications stopped since last EPIC medication reconciliation:   There are no discontinued medications.    Medications started since last [facility] medication reconciliation:  No orders of the defined types were placed in this encounter.    REVIEW OF SYSTEMS:  4 point ROS including Respiratory, CV, GI and , other than that noted in the HPI,  is negative    Physical Exam:  /64  Pulse 88  Temp 98.1  F (36.7  C)  Resp 18  Ht 6' 2\" (1.88 m)  Wt 172 lb 1.6 oz (78.1 kg)  SpO2 98%  BMI 22.1 kg/m2  BP Readin/71, 120/78, 108/68              HR:  66-88    GENERAL APPEARANCE:  Alert, in no distress, oriented, thin, cooperative, elderly male ambulating with use of front-wheeled walker  ENT:  Mouth and posterior oropharynx normal, moist mucous membranes, [place], edentulous  EYES:  EOM, conjunctivae, lids, pupils and irises normal, wears glasses  NECK:  No adenopathy, masses or thyromegaly  RESP:  respiratory effort and palpation of chest normal, lungs clear to auscultation, no respiratory distress  CV:  Palpation and auscultation of heart done, regular rate and rhythm, no murmur, rub, or gallop, no edema, +2 pedal pulses  ABDOMEN:  normal bowel sounds, soft, nontender, no hepatosplenomegaly or other masses  M/S:   Gait and station -slow and steady with " use of walker; Digits and nails normal  SKIN:  Palpation of skin and subcutaneous tissue baseline, Inspection of skin and subcutaneous tissue baseline  NEURO:   Cranial nerves 2-12 are normal tested and grossly at patient's baseline  PSYCH:  oriented X 3, insight and judgement impaired, affect and mood normal, may be slightly forgetful - doesn't say much at baseline    Recent Labs:   CBC RESULTS:   Recent Labs   Lab Test 02/28/18   WBC  5.3   RBC  3.65*   HGB  9.3*   HCT  29.6*   MCV  81.1   RDW  16.2*   PLT  333         Assessment/Plan:   Diagnosis Comments   1. 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 FTT (failure to thrive) in adult   dietary involvement  We will monitor for discharge planning   3. Tardive dyskinesia   chronic  Stable on current regimen; continue medications as currently ordered.   4. Orthostatic hypotension   will continue to monitor while in facility  Stable without medical intervention   5. Adrenal insufficiency (H)   Stable on current regimen; continue medications as currently ordered.   6. Schizophrenia, unspecified type (H)   Stable on current regimen; continue medications as currently ordered.   7. Physical deconditioning   PT/OT - adv per their recommendations       Electronically signed by  CARMELLA Rojas CNP

## 2018-03-22 NOTE — PROGRESS NOTES
[place] [facility]    Chief Complaint   Patient presents with     RECHECK       HPI:    Ralph Wilcox is a 72 year old  (1945), who is being seen today for an episodic care visit [facility] [place].  HPI information obtained from: facility chart records, facility staff and patient report.    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 S/p 3u pRBC on admission. S/p EGD and colonoscopy on 2/13: EGD with non-bleeding small ulcers and colonoscopy with radiation proctopathy, s/p Sigmoidoscopy aid RFA for slow oozing angioectasis. Pt continues to have intermittent bloody stools since the RFA and a steady decline in his hgb to 7.7 and was prophylactically transfused with 1 u of pRBC with appropriate response. On discharge hgb is 9.5. GI contacted one day  prior to discharge about intermitent bloody stools and steady decline in hgb aid suggested slow bleeding is expected and that pt may require out-patient blood transfusions as needed until he follows up with then in 4 weeks for possible Sigmoidoscopy with RFA. On discharge pt tolerating regular diet and no c/a of bloody stools for 24 hrs.  - hgb chick in 1 week at TCU and transfusion of pRBC if '7  - follow up with PCP in 1 week  - follow up with GI in 4 weeks as stated above  Patient underwent flexsig on 3/23 - post-op Hgb assessments note drop in Hgb (expected post-procedure slightly)   Ref. Range 2/28/2018 00:00 3/9/2018 00:00 3/23/2018 00:00 3/24/2018 00:00 3/25/2018 00:00 3/27/2018 00:00   Hemoglobin Latest Ref Range: 13.4 - 17.5 g/dL 9.3 (L) 9.2 (L) 8.5 (L) 8.7 (L) 8.5 (L) 7.7 (L)   Patient feeling okay today - VS have remained stable. Report for procedure noted ongoing slight bleed - had f/u with GI on 3/23, do not  have this note.  No active treatment regimen - ongoing monitoring of Hgb with transfusion parameters    FTT (failure to thrive) in adult  Failur? to Thrive: pressented with c/o of inability to independatly preform ADLs, Progressively worsening self cares noted by HHN. Also noted prior  concerns of worsening dementia per chart review. Pt is decisional at this point and would like to return home but amenable to acute rehab. On discharge pt endorsed good appetite and increased mobility.  - could benefit from IT evaluation for dementia by PCP  Currently being discharged to [facility] for acute rehab  Weight has remained stable  Wt Readings from Last 3 Encounters:   03/22/18 172 lb (78 kg)   03/14/18 172 lb 1.6 oz (78.1 kg)   03/08/18 172 lb 8 oz (78.2 kg)     Tardive dyskinesia  Chronic  Terbinafine restarted inpatient  Patient denies over exaggerated symptoms of tardive dyskinesia since admission with current regimen    Orthostatic hypotension  Blood pressures have remained stable since arrival to facility    Schizophrenia, unspecified type (H)  Chronic  On regimen of Zoloft Seroquel and Klonopin  No behaviors noted since arrival to facility  PHQ9: 08/27  Denies symptoms of depression today  BIMs: 15/15    Adrenal insufficiency (H)  Chronic  Continues on regimen of Florinef    Physical deconditioning  Secondary to hospitalization and above-noted diagnoses    PT discharged patient on 3/19: Discharge summary: patient has been seen for 18 skilled PT treatment sessions. Demonstrates the ability to ambulate independently throughout the facility. Performs transfers and bed mobility independently.  Patient has been provided with a home exercise program. Demonstrates independence and proficiency with home exercise program. Patient will be D/C'd from PT on 03/19/18. Plans for patient to move to an assisted living facility when placement is found.    Nurse manager did requested re-evalutation with fatigue/fall 2/2  "Hgb drop - completed on 3/26: Patient had a fall associated with anemia. Remains unsteady due to low HGB. Patient will continue with ambulaiton program until anemia can be resolved.     ALLERGIES: No known allergies  Past Medical, Surgical, Family and Social History reviewed and [facility] in [facility].    Current Outpatient Prescriptions   Medication Sig Dispense Refill     acetaminophen (TYLENOL) 325 MG tablet Take 325-650 mg by mouth every 4 hours as needed       clonazePAM (KLONOPIN) 0.5 MG tablet Take 0.25 mg by mouth At Bedtime       fludrocortisone (FLORINEF) 0.1 MG tablet Take 0.1 mg by mouth daily       gabapentin (NEURONTIN) 600 MG tablet Take 600 mg by mouth At Bedtime       QUEtiapine (SEROQUEL) 100 MG tablet Take 100 mg by mouth At Bedtime       terazosin (HYTRIN) 1 MG capsule Take 1 mg by mouth At Bedtime       vitamin E (TOCOPHEROL) 1000 UNIT capsule Take 1,000 Units by mouth every morning       sertraline (ZOLOFT) 100 MG tablet Take 250 mg by mouth At Bedtime       Medications reviewed:  Medications reconciled to facility chart and changes were made to reflect current medications as identified as above med list. Below are the changes that were made:   Medications stopped since last EPIC medication reconciliation:   There are no discontinued medications.    Medications started since last [facility] medication reconciliation:  No orders of the defined types were placed in this encounter.    REVIEW OF SYSTEMS:  4 point ROS including Respiratory, CV, GI and , other than that noted in the HPI,  is negative    Physical Exam:  BP (!) 144/91  Pulse 81  Temp 97.9  F (36.6  C)  Resp 18  Ht 6' 2\" (1.88 m)  Wt 172 lb (78 kg)  SpO2 98%  BMI 22.08 kg/m2  BP Readin/76, 105/80, 108/55             HR:  67-88  GENERAL APPEARANCE:  Alert, in no distress, oriented, thin, cooperative, elderly male resting in bed  ENT:  Mouth and posterior oropharynx normal, moist mucous membranes, [place], edentulous  EYES:  EOM, " conjunctivae, lids, pupils and irises normal, wears glasses  NECK:  No adenopathy, masses or thyromegaly  RESP:  respiratory effort and palpation of chest normal, lungs clear to auscultation, no respiratory distress  CV:  Palpation and auscultation of heart done, regular rate and rhythm, no murmur, rub, or gallop, no edema, +2 pedal pulses  ABDOMEN:  normal bowel sounds, soft, nontender, no hepatosplenomegaly or other masses  M/S:   Gait and station -slow and steady with use of walker; Digits and nails normal  SKIN:  Palpation of skin and subcutaneous tissue baseline, Inspection of skin and subcutaneous tissue baseline  NEURO:   Cranial nerves 2-12 are normal tested and grossly at patient's baseline  PSYCH:  oriented X 3, insight and judgement impaired, affect and mood normal    Recent Labs:   CBC RESULTS:   Recent Labs   Lab Test 03/09/18 02/28/18   WBC   --   5.3   RBC   --   3.65*   HGB  9.2*  9.3*   HCT   --   29.6*   MCV   --   81.1   RDW   --   16.2*   PLT   --   333         Assessment/Plan:   Diagnosis Comments   1. 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 FTT (failure to thrive) in adult   dietary involvement  We will monitor for discharge planning   3. Tardive dyskinesia   chronic  Stable on current regimen; continue medications as currently ordered.   4. Orthostatic hypotension   will continue to monitor while in facility  Stable without medical intervention   5. Adrenal insufficiency (H)   Stable on current regimen; continue medications as currently ordered.   6. Schizophrenia, unspecified type (H)   Stable on current regimen; continue medications as currently ordered.   7. Physical deconditioning  Continue ambulation program as recommended per PT       Electronically signed by  CARMELLA Rojas CNP

## 2018-03-23 LAB
BUN SERPL-MCNC: 34 MG/DL (ref 9–26)
CALCIUM SERPL-MCNC: 8.9 MG/DL (ref 8.4–10.2)
CHLORIDE SERPLBLD-SCNC: 108 MMOL/L (ref 98–109)
CO2 SERPL-SCNC: 26 MMOL/L (ref 22–31)
CREAT SERPL-MCNC: 1.22 MG/DL (ref 0.73–1.18)
DIFFERENTIAL: ABNORMAL
ERYTHROCYTE [DISTWIDTH] IN BLOOD BY AUTOMATED COUNT: 17.5 % (ref 11–15)
GFR SERPL CREATININE-BSD FRML MDRD: 59 ML/MIN/1.73M2
GLUCOSE SERPL-MCNC: 94 MG/DL (ref 70–100)
HCT VFR BLD AUTO: 27.2 % (ref 39–51)
HEMOGLOBIN: 8.5 G/DL (ref 13.4–17.5)
MCV RBC AUTO: 78.4 FL (ref 80–100)
PLATELET # BLD AUTO: 266 K/CMM (ref 140–450)
POTASSIUM SERPL-SCNC: 4.2 MMOL/L (ref 3.5–5.2)
RBC # BLD AUTO: 3.47 M/CMM (ref 4.2–5.9)
SODIUM SERPL-SCNC: 141 MMOL/L (ref 136–145)
WBC # BLD AUTO: 10.1 K/CMM (ref 3.8–11)

## 2018-03-24 LAB
DIFFERENTIAL: ABNORMAL
ERYTHROCYTE [DISTWIDTH] IN BLOOD BY AUTOMATED COUNT: 17.6 % (ref 11–15)
HCT VFR BLD AUTO: 27 % (ref 39–51)
HEMOGLOBIN: 8.7 G/DL (ref 13.1–17.5)
MCV RBC AUTO: 78.9 FL (ref 80–100)
PLATELET # BLD AUTO: 237 K/CMM (ref 140–450)
RBC # BLD AUTO: 3.42 M/CMM (ref 4.2–5.9)
WBC # BLD AUTO: 7 K/CMM (ref 3.8–11)

## 2018-03-25 LAB — HEMOGLOBIN: 8.5 G/DL (ref 13.4–17.5)

## 2018-03-27 ENCOUNTER — TRANSFERRED RECORDS (OUTPATIENT)
Dept: HEALTH INFORMATION MANAGEMENT | Facility: CLINIC | Age: 73
End: 2018-03-27

## 2018-03-27 LAB — HEMOGLOBIN: 7.7 G/DL (ref 13.4–17.5)

## 2018-03-28 VITALS
BODY MASS INDEX: 22.07 KG/M2 | WEIGHT: 172 LBS | RESPIRATION RATE: 18 BRPM | HEIGHT: 74 IN | OXYGEN SATURATION: 98 % | DIASTOLIC BLOOD PRESSURE: 91 MMHG | TEMPERATURE: 97.9 F | HEART RATE: 81 BPM | SYSTOLIC BLOOD PRESSURE: 144 MMHG

## 2018-03-29 ENCOUNTER — NURSING HOME VISIT (OUTPATIENT)
Dept: GERIATRICS | Facility: CLINIC | Age: 73
End: 2018-03-29

## 2018-03-29 DIAGNOSIS — E27.40 ADRENAL INSUFFICIENCY (H): ICD-10-CM

## 2018-03-29 DIAGNOSIS — R53.81 PHYSICAL DECONDITIONING: ICD-10-CM

## 2018-03-29 DIAGNOSIS — R62.7 FTT (FAILURE TO THRIVE) IN ADULT: ICD-10-CM

## 2018-03-29 DIAGNOSIS — K92.1 HEMATOCHEZIA: ICD-10-CM

## 2018-03-29 DIAGNOSIS — G24.01 TARDIVE DYSKINESIA: ICD-10-CM

## 2018-03-29 DIAGNOSIS — I95.1 ORTHOSTATIC HYPOTENSION: ICD-10-CM

## 2018-03-29 DIAGNOSIS — F20.9 SCHIZOPHRENIA, UNSPECIFIED TYPE (H): ICD-10-CM

## 2018-03-29 DIAGNOSIS — D64.89 ANEMIA DUE TO RADIATION: Primary | ICD-10-CM

## 2018-03-29 PROCEDURE — 99309 SBSQ NF CARE MODERATE MDM 30: CPT | Performed by: NURSE PRACTITIONER

## 2018-03-29 NOTE — LETTER
3/29/2018        RE: Ralph Wilcox  [address] [address]        [place] [facility]    Chief Complaint   Patient presents with     RECHECK       HPI:    Ralph Wilcox is a 72 year old  (1945), who is being seen today for an episodic care visit at [facility].  HPI information obtained from: facility chart records, facility staff and patient report.    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 S/p 3u pRBC on admission. S/p EGD and colonoscopy on 2/13: EGD with non-bleeding small ulcers and colonoscopy with radiation proctopathy, s/p Sigmoidoscopy aid RFA for slow oozing angioectasis. Pt continues to have intermittent bloody stools since the RFA and a steady decline in his hgb to 7.7 and was prophylactically transfused with 1 u of pRBC with appropriate response. On discharge hgb is 9.5. GI contacted one day  prior to discharge about intermitent bloody stools and steady decline in hgb aid suggested slow bleeding is expected and that pt may require out-patient blood transfusions as needed until he follows up with then in 4 weeks for possible Sigmoidoscopy with RFA. On discharge pt tolerating regular diet and no c/a of bloody stools for 24 hrs.  - hgb chick in 1 week at TCU and transfusion of pRBC if '7  - follow up with PCP in 1 week  - follow up with GI in 4 weeks as stated above  Patient underwent flexsig on 3/23 - post-op Hgb assessments note drop in Hgb (expected post-procedure slightly)   Ref. Range 2/28/2018 00:00 3/9/2018 00:00 3/23/2018 00:00 3/24/2018 00:00 3/25/2018 00:00 3/27/2018 00:00   Hemoglobin Latest Ref Range: 13.4 - 17.5 g/dL 9.3 (L) 9.2 (L) 8.5 (L) 8.7 (L) 8.5 (L) 7.7 (L)   Patient feeling okay today - VS have  remained stable. Report for procedure noted ongoing slight bleed - had f/u with GI on 3/23, do not have this note.  No active treatment regimen - ongoing monitoring of Hgb with transfusion parameters    FTT (failure to thrive) in adult  Failur? to Thrive: pressented with c/o of inability to independatly preform ADLs, Progressively worsening self cares noted by HHN. Also noted prior  concerns of worsening dementia per chart review. Pt is decisional at this point and would like to return home but amenable to acute rehab. On discharge pt endorsed good appetite and increased mobility.  - could benefit from IT evaluation for dementia by PCP  Currently being discharged to [facility] for acute rehab  Weight has remained stable  Wt Readings from Last 3 Encounters:   03/22/18 172 lb (78 kg)   03/14/18 172 lb 1.6 oz (78.1 kg)   03/08/18 172 lb 8 oz (78.2 kg)     Tardive dyskinesia  Chronic  Terbinafine restarted inpatient  Patient denies over exaggerated symptoms of tardive dyskinesia since admission with current regimen    Orthostatic hypotension  Blood pressures have remained stable since arrival to facility    Schizophrenia, unspecified type (H)  Chronic  On regimen of Zoloft Seroquel and Klonopin  No behaviors noted since arrival to facility  PHQ9: 08/27  Denies symptoms of depression today  BIMs: 15/15    Adrenal insufficiency (H)  Chronic  Continues on regimen of Florinef    Physical deconditioning  Secondary to hospitalization and above-noted diagnoses    PT discharged patient on 3/19: Discharge summary: patient has been seen for 18 skilled PT treatment sessions. Demonstrates the ability to ambulate independently throughout the facility. Performs transfers and bed mobility independently.  Patient has been provided with a home exercise program. Demonstrates independence and proficiency with home exercise program. Patient will be D/C'd from PT on 03/19/18. Plans for patient to move to an assisted living  "facility when placement is found.    Nurse manager did requested re-evalutation with fatigue/fall 2/2 Hgb drop - completed on 3/26: Patient had a fall associated with anemia. Remains unsteady due to low HGB. Patient will continue with ambulaiton program until anemia can be resolved.     ALLERGIES: No known allergies  Past Medical, Surgical, Family and Social History reviewed and updated in [facility].    Current Outpatient Prescriptions   Medication Sig Dispense Refill     acetaminophen (TYLENOL) 325 MG tablet Take 325-650 mg by mouth every 4 hours as needed       clonazePAM (KLONOPIN) 0.5 MG tablet Take 0.25 mg by mouth At Bedtime       fludrocortisone (FLORINEF) 0.1 MG tablet Take 0.1 mg by mouth daily       gabapentin (NEURONTIN) 600 MG tablet Take 600 mg by mouth At Bedtime       QUEtiapine (SEROQUEL) 100 MG tablet Take 100 mg by mouth At Bedtime       terazosin (HYTRIN) 1 MG capsule Take 1 mg by mouth At Bedtime       vitamin E (TOCOPHEROL) 1000 UNIT capsule Take 1,000 Units by mouth every morning       sertraline (ZOLOFT) 100 MG tablet Take 250 mg by mouth At Bedtime       Medications reviewed:  Medications reconciled to facility chart and changes were made to reflect current medications as identified as above med list. Below are the changes that were made:   Medications stopped since last EPIC medication reconciliation:   There are no discontinued medications.    Medications started since last [facility] medication reconciliation:  No orders of the defined types were placed in this encounter.    REVIEW OF SYSTEMS:  4 point ROS including Respiratory, CV, GI and , other than that noted in the HPI,  is negative    Physical Exam:  BP (!) 144/91  Pulse 81  Temp 97.9  F (36.6  C)  Resp 18  Ht 6' 2\" (1.88 m)  Wt 172 lb (78 kg)  SpO2 98%  BMI 22.08 kg/m2  BP Readin/76, 105/80, 108/55             HR:  67-88  GENERAL APPEARANCE:  Alert, in no distress, oriented, thin, cooperative, elderly male resting in bed  ENT:  " Mouth and posterior oropharynx normal, moist mucous membranes, [place], edentulous  EYES:  EOM, conjunctivae, lids, pupils and irises normal, wears glasses  NECK:  No adenopathy, masses or thyromegaly  RESP:  respiratory effort and palpation of chest normal, lungs clear to auscultation, no respiratory distress  CV:  Palpation and auscultation of heart done, regular rate and rhythm, no murmur, rub, or gallop, no edema, +2 pedal pulses  ABDOMEN:  normal bowel sounds, soft, nontender, no hepatosplenomegaly or other masses  M/S:   Gait and station -slow and steady with use of walker; Digits and nails normal  SKIN:  Palpation of skin and subcutaneous tissue baseline, Inspection of skin and subcutaneous tissue baseline  NEURO:   Cranial nerves 2-12 are normal tested and grossly at patient's baseline  PSYCH:  oriented X 3, insight and judgement impaired, affect and mood normal    Recent Labs:   CBC RESULTS:   Recent Labs   Lab Test 03/09/18 02/28/18   WBC   --   5.3   RBC   --   3.65*   HGB  9.2*  9.3*   HCT   --   29.6*   MCV   --   81.1   RDW   --   16.2*   PLT   --   333         Assessment/Plan:   Diagnosis Comments   1. 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 FTT (failure to thrive) in adult   dietary involvement  We will monitor for discharge planning   3. Tardive dyskinesia   chronic  Stable on current regimen; continue medications as currently ordered.   4. Orthostatic hypotension   will continue to monitor while in facility  Stable without medical intervention   5. Adrenal insufficiency (H)   Stable on current regimen; continue medications as currently ordered.   6. Schizophrenia, unspecified type (H)   Stable on current regimen; continue medications as currently ordered.   7. Physical deconditioning  Continue ambulation program as recommended per PT        Electronically signed by  CARMELLA Rojas CNP                      Sincerely,        CARMELLA Rojas CNP

## 2018-04-03 ENCOUNTER — TRANSFERRED RECORDS (OUTPATIENT)
Dept: HEALTH INFORMATION MANAGEMENT | Facility: CLINIC | Age: 73
End: 2018-04-03

## 2018-04-03 LAB — HEMOGLOBIN: 8.5 G/DL (ref 13.4–17.5)

## 2018-04-05 ENCOUNTER — NURSING HOME VISIT (OUTPATIENT)
Dept: GERIATRICS | Facility: CLINIC | Age: 73
End: 2018-04-05

## 2018-04-05 VITALS
TEMPERATURE: 97.9 F | SYSTOLIC BLOOD PRESSURE: 144 MMHG | WEIGHT: 178.5 LBS | HEIGHT: 74 IN | BODY MASS INDEX: 22.91 KG/M2 | DIASTOLIC BLOOD PRESSURE: 91 MMHG | HEART RATE: 81 BPM | RESPIRATION RATE: 18 BRPM | OXYGEN SATURATION: 98 %

## 2018-04-05 DIAGNOSIS — I95.1 ORTHOSTATIC HYPOTENSION: ICD-10-CM

## 2018-04-05 DIAGNOSIS — E27.40 ADRENAL INSUFFICIENCY (H): ICD-10-CM

## 2018-04-05 DIAGNOSIS — F20.9 SCHIZOPHRENIA, UNSPECIFIED TYPE (H): ICD-10-CM

## 2018-04-05 DIAGNOSIS — G24.01 TARDIVE DYSKINESIA: ICD-10-CM

## 2018-04-05 DIAGNOSIS — K92.2 GASTROINTESTINAL HEMORRHAGE, UNSPECIFIED GASTROINTESTINAL HEMORRHAGE TYPE: Primary | ICD-10-CM

## 2018-04-05 PROCEDURE — 99309 SBSQ NF CARE MODERATE MDM 30: CPT | Performed by: INTERNAL MEDICINE

## 2018-04-05 NOTE — PROGRESS NOTES
[place] [facility]  Nursing Home Regulatory Visit  April 5, 2018    Chief Complaint   Patient presents with     prison Regulatory       HPI:    Ralph iWlcox is a 72 year old  (1945), who is being seen today for a federally mandated E/M visit at [facility]. Today's concerns are:    1) GI Bleed s/p RFP for Angioectasis/Radiation Proctopathy (2/14/18) -- continues to have some bleeding. Was seen by GI at the [facility] on 3/22/18 for flex sig and RFP; however, I don't have any documentation from that visit.   2) Schizophrenia / Tardive Dyskinesia -- Appears compensated. Managed with clonazepam 0.25 mg qhs, quetiapine 100 mg qhs and sertraline 250 mg qhs  3) Orthostatic Hypotension -- SBPs labile in 110s-130s, most 120s-130s. Managed with fludrocortisone 0.1 mg daily     ALLERGIES: No known allergies    PAST MEDICAL HISTORY:   Schizophrenia, BPH, orthostatic hypotension     PAST SURGICAL HISTORY:   No past surgical history on file.    FAMILY HISTORY:   No family history on file.    SOCIAL HISTORY:   Lives in a SNF    MEDICATIONS:  Current Outpatient Prescriptions   Medication Sig Dispense Refill     acetaminophen (TYLENOL) 325 MG tablet Take 325-650 mg by mouth every 4 hours as needed       clonazePAM (KLONOPIN) 0.5 MG tablet Take 0.25 mg by mouth At Bedtime       fludrocortisone (FLORINEF) 0.1 MG tablet Take 0.1 mg by mouth daily       gabapentin (NEURONTIN) 600 MG tablet Take 600 mg by mouth At Bedtime       QUEtiapine (SEROQUEL) 100 MG tablet Take 100 mg by mouth At Bedtime       terazosin (HYTRIN) 1 MG capsule Take 1 mg by mouth At Bedtime       vitamin E (TOCOPHEROL) 1000 UNIT capsule Take 1,000 Units by mouth every morning       sertraline (ZOLOFT) 100 MG tablet Take 250 mg by mouth At Bedtime         Medications reviewed:  Medications reconciled to facility chart and changes were made to reflect current medications as identified as above med list. Below are the changes that were  "made:   Medications stopped since last EPIC medication reconciliation:   There are no discontinued medications.  Medications started since last [facility] medication reconciliation:  No orders of the defined types were placed in this encounter.      Case Management:  I have reviewed the care plan and MDS and do agree with the plan.   Information reviewed:  Medications, vital signs, orders, and nursing notes.    ROS:  4 point ROS neg other than the symptoms noted above in the HPI.    PHYSICAL EXAM:  BP (!) 144/91  Pulse 81  Temp 97.9  F (36.6  C)  Resp 18  Ht 6' 2\" (1.88 m)  Wt 178 lb 8 oz (81 kg)  SpO2 98%  BMI 22.92 kg/m2  Gen: laying  in bed, alert, cooperative and in no acute distress  Resp: breathing non-labored  GI: abdomen soft, not-tender  Ext: no LE edema  Neuro: CX II-XII grossly in tact; ROM in all four extremities grossly in tact  Psych: alert and oriented x3; normal affect    Lab/Diagnostic data:  Reviewed as per Epic    ASSESSMENT/PLAN    1) GI Bleed s/p RFP for Angioectasis/Radiation Proctopathy (2/14/18)   Hontinues to have some bleeding. Was seen by GI at the [facility] on 3/22/18 for flex sig and RFP; however, I don't have any documentation from that visit.   -- follow clinically  -- follow up at the [facility] as scheduled    2) Schizophrenia / Tardive Dyskinesia   Appears compensated.   -- continues on clonazepam 0.25 mg qhs, quetiapine 100 mg qhs and sertraline 250 mg qhs  -- supportive cares    3) Orthostatic Hypotension / Adrenal Insufficiency   SBPs labile in 110s-130s, most 120s-130s.   -- continues on fludrocortisone 0.1 mg daily     Electronically signed by:  Stephanie Coronel MD    "

## 2018-04-05 NOTE — LETTER
4/5/2018        RE: Ralph Wilcox  [address] [address]        [place] [facility]  Nursing Home Regulatory Visit  April 5, 2018    Chief Complaint   Patient presents with     penitentiary Regulatory       HPI:    Ralph Wilcox is a 72 year old  (1945), who is being seen today for a federally mandated E/M visit at [facility]. Today's concerns are:    1) GI Bleed s/p RFP for Angioectasis/Radiation Proctopathy (2/14/18) -- continues to have some bleeding. Was seen by GI at the [facility] on 3/22/18 for flex sig and RFP; however, I don't have any documentation from that visit.   2) Schizophrenia / Tardive Dyskinesia -- Appears compensated. Managed with clonazepam 0.25 mg qhs, quetiapine 100 mg qhs and sertraline 250 mg qhs  3) Orthostatic Hypotension -- SBPs labile in 110s-130s, most 120s-130s. Managed with fludrocortisone 0.1 mg daily     ALLERGIES: No known allergies    PAST MEDICAL HISTORY:   Schizophrenia, BPH, orthostatic hypotension     PAST SURGICAL HISTORY:   No past surgical history on file.    FAMILY HISTORY:   No family history on file.    SOCIAL HISTORY:   Lives in a SNF    MEDICATIONS:  Current Outpatient Prescriptions   Medication Sig Dispense Refill     acetaminophen (TYLENOL) 325 MG tablet Take 325-650 mg by mouth every 4 hours as needed       clonazePAM (KLONOPIN) 0.5 MG tablet Take 0.25 mg by mouth At Bedtime       fludrocortisone (FLORINEF) 0.1 MG tablet Take 0.1 mg by mouth daily       gabapentin (NEURONTIN) 600 MG tablet Take 600 mg by mouth At Bedtime       QUEtiapine (SEROQUEL) 100 MG tablet Take 100 mg by mouth At Bedtime       terazosin (HYTRIN) 1 MG capsule Take 1 mg by mouth At Bedtime       vitamin E (TOCOPHEROL) 1000 UNIT capsule Take 1,000 Units by mouth every morning       sertraline (ZOLOFT) 100 MG tablet Take 250 mg by mouth At Bedtime         Medications reviewed:  Medications reconciled to facility chart and changes were made to  "reflect current medications as identified as above med list. Below are the changes that were made:   Medications stopped since last EPIC medication reconciliation:   There are no discontinued medications.  Medications started since last [facility] medication reconciliation:  No orders of the defined types were placed in this encounter.      Case Management:  I have reviewed the care plan and MDS and do agree with the plan.   Information reviewed:  Medications, vital signs, orders, and nursing notes.    ROS:  4 point ROS neg other than the symptoms noted above in the HPI.    PHYSICAL EXAM:  BP (!) 144/91  Pulse 81  Temp 97.9  F (36.6  C)  Resp 18  Ht 6' 2\" (1.88 m)  Wt 178 lb 8 oz (81 kg)  SpO2 98%  BMI 22.92 kg/m2  Gen: laying  in bed, alert, cooperative and in no acute distress  Resp: breathing non-labored  GI: abdomen soft, not-tender  Ext: no LE edema  Neuro: CX II-XII grossly in tact; ROM in all four extremities grossly in tact  Psych: alert and oriented x3; normal affect    Lab/Diagnostic data:  Reviewed as per Epic    ASSESSMENT/PLAN    1) GI Bleed s/p RFP for Angioectasis/Radiation Proctopathy (2/14/18)   Hontinues to have some bleeding. Was seen by GI at the [facility] on 3/22/18 for flex sig and RFP; however, I don't have any documentation from that visit.   -- follow clinically  -- follow up at the [facility] as scheduled    2) Schizophrenia / Tardive Dyskinesia   Appears compensated.   -- continues on clonazepam 0.25 mg qhs, quetiapine 100 mg qhs and sertraline 250 mg qhs  -- supportive cares    3) Orthostatic Hypotension / Adrenal Insufficiency   SBPs labile in 110s-130s, most 120s-130s.   -- continues on fludrocortisone 0.1 mg daily     Electronically signed by:  Stephanie Coronel MD        "

## 2018-04-10 ENCOUNTER — TRANSFERRED RECORDS (OUTPATIENT)
Dept: HEALTH INFORMATION MANAGEMENT | Facility: CLINIC | Age: 73
End: 2018-04-10

## 2018-04-10 LAB — HEMOGLOBIN: 8.5 G/DL (ref 13.4–17.5)

## 2018-04-11 VITALS
HEIGHT: 74 IN | OXYGEN SATURATION: 99 % | SYSTOLIC BLOOD PRESSURE: 157 MMHG | DIASTOLIC BLOOD PRESSURE: 69 MMHG | TEMPERATURE: 97.4 F | BODY MASS INDEX: 22.91 KG/M2 | WEIGHT: 178.5 LBS | HEART RATE: 99 BPM | RESPIRATION RATE: 18 BRPM

## 2018-04-11 NOTE — PROGRESS NOTES
[place] [facility]    Chief Complaint   Patient presents with     RECHECK       HPI:    Ralph Wilcox is a 72 year old  (1945), who is being seen today for an episodic care visit at [facility].  HPI information obtained from: facility chart records, facility staff and patient report.    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 S/p 3u pRBC on admission. S/p EGD and colonoscopy on 2/13: EGD with non-bleeding small ulcers and colonoscopy with radiation proctopathy, s/p Sigmoidoscopy aid RFA for slow oozing angioectasis. Pt continues to have intermittent bloody stools since the RFA and a steady decline in his hgb to 7.7 and was prophylactically transfused with 1 u of pRBC with appropriate response. On discharge hgb is 9.5. GI contacted one day  prior to discharge about intermitent bloody stools and steady decline in hgb aid suggested slow bleeding is expected and that pt may require out-patient blood transfusions as needed until he follows up with then in 4 weeks for possible Sigmoidoscopy with RFA. On discharge pt tolerating regular diet and no c/a of bloody stools for 24 hrs.  - hgb chick in 1 week at TCU and transfusion of pRBC if '7  - follow up with PCP in 1 week  - follow up with GI in 4 weeks as stated above  Patient underwent flexsig on 3/23 - post-op Hgb assessments note drop in Hgb (expected post-procedure slightly) - it is now stabilizing   Ref. Range 3/23/2018 00:00 3/24/2018 00:00 3/25/2018 00:00 3/27/2018 00:00 04/10/2018  00:00   Hemoglobin Latest Ref Range: 13.4 - 17.5 g/dL 8.5 (L) 8.7 (L) 8.5 (L) 7.7 (L) 8.5 (L)   Patient feeling well today - no c/o increased fatigue  No active treatment regimen - ongoing monitoring of Hgb with transfusion  parameters    FTT (failure to thrive) in adult  Failur? to Thrive: pressented with c/o of inability to independatly preform ADLs, Progressively worsening self cares noted by HHN. Also noted prior  concerns of worsening dementia per chart review. Pt is decisional at this point and would like to return home but amenable to acute rehab. On discharge pt endorsed good appetite and increased mobility.  - could benefit from IT evaluation for dementia by PCP  Currently being discharged to [facility]CU for acute rehab  Weight has remained stable  Wt Readings from Last 4 Encounters:   04/11/18 178 lb 8 oz (81 kg)   04/05/18 178 lb 8 oz (81 kg)   03/22/18 172 lb (78 kg)   03/14/18 172 lb 1.6 oz (78.1 kg)     Tardive dyskinesia  Chronic  Terbinafine restarted inpatient  Patient denies over exaggerated symptoms of tardive dyskinesia since admission with current regimen    Orthostatic hypotension  Blood pressures have remained stable since arrival to facility    Schizophrenia, unspecified type (H)  Chronic  On regimen of Zoloft Seroquel and Klonopin  No behaviors noted since arrival to facility  PHQ9: 08/27  Denies symptoms of depression today  BIMs: 15/15    Adrenal insufficiency (H)  Chronic  Continues on regimen of Florinef    Physical deconditioning  Secondary to hospitalization and above-noted diagnoses    PT discharged patient on 3/19: Discharge summary: patient has been seen for 18 skilled PT treatment sessions. Demonstrates the ability to ambulate independently throughout the facility. Performs transfers and bed mobility independently.  Patient has been provided with a home exercise program. Demonstrates independence and proficiency with home exercise program. Patient will be D/C'd from PT on 03/19/18. Plans for patient to move to an assisted living facility when placement is found.    Nurse manager did requested re-evalutation with fatigue/fall 2/2 Hgb drop - completed on 3/26: Patient had a fall associated  "with anemia. Remains unsteady due to low HGB. Patient will continue with ambulaiton program until anemia can be resolved.     ALLERGIES: No known allergies  Past Medical, Surgical, Family and Social History reviewed and updated in [facility].    Current Outpatient Prescriptions   Medication Sig Dispense Refill     acetaminophen (TYLENOL) 325 MG tablet Take 325-650 mg by mouth every 4 hours as needed       clonazePAM (KLONOPIN) 0.5 MG tablet Take 0.25 mg by mouth At Bedtime       fludrocortisone (FLORINEF) 0.1 MG tablet Take 0.1 mg by mouth daily       gabapentin (NEURONTIN) 600 MG tablet Take 600 mg by mouth At Bedtime       QUEtiapine (SEROQUEL) 100 MG tablet Take 100 mg by mouth At Bedtime       terazosin (HYTRIN) 1 MG capsule Take 1 mg by mouth At Bedtime       vitamin E (TOCOPHEROL) 1000 UNIT capsule Take 1,000 Units by mouth every morning       sertraline (ZOLOFT) 100 MG tablet Take 250 mg by mouth At Bedtime       Medications reviewed:  Medications reconciled to facility chart and changes were made to reflect current medications as identified as above med list. Below are the changes that were made:   Medications stopped since last EPIC medication reconciliation:   There are no discontinued medications.    Medications started since last [facility] medication reconciliation:  No orders of the defined types were placed in this encounter.    REVIEW OF SYSTEMS:  4 point ROS including Respiratory, CV, GI and , other than that noted in the HPI,  is negative    Physical Exam:  /69  Pulse 99  Temp 97.4  F (36.3  C)  Resp 18  Ht 6' 2\" (1.88 m)  Wt 178 lb 8 oz (81 kg)  SpO2 99%  BMI 22.92 kg/m2  BP Readin/91, 124/76, 105/80               HR:  67-99  GENERAL APPEARANCE:  Alert, in no distress, oriented, thin, cooperative, elderly male resting in bed  ENT:  Mouth and posterior oropharynx normal, moist mucous membranes, [place], edentulous  EYES:  EOM, conjunctivae, lids, pupils and irises normal, wears glasses  NECK: "  No adenopathy, masses or thyromegaly  RESP:  Respiratory effort and palpation of chest normal, lungs clear to auscultation, no respiratory distress  CV:  Palpation and auscultation of heart done, regular rate and rhythm, no murmur, rub, or gallop, no edema, +2 pedal pulses  ABDOMEN:  Normal bowel sounds, soft, nontender, no hepatosplenomegaly or other masses  M/S:   Gait and station -slow and steady with use of walker; Digits and nails normal  SKIN:  Palpation of skin and subcutaneous tissue baseline, Inspection of skin and subcutaneous tissue baseline  NEURO:   Cranial nerves 2-12 are normal tested and grossly at patient's baseline  PSYCH:  oriented X 3, insight and judgement impaired, affect and mood normal    Recent Labs:   CBC RESULTS:   Lab Test 4/10/18 04/03/18 03/27/18  03/24/18 03/23/18   WBC  --   --    --    --   7.0  10.1   RBC  --   --    --    --   3.42*  3.47*   HGB  8.5  8.5*  7.7*   < >  8.7*  8.5*   HCT  --   --    --    --   27.0*  27.2*   MCV  --   --    --    --   78.9*  78.4*   RDW  --   --    --    --   17.6*  17.5*   PLT  --   --    --    --   237  266       Last Basic Metabolic Panel:  Recent Labs   Lab Test 03/23/18   NA  141   POTASSIUM  4.2   CHLORIDE  108   NARDA  8.9   CO2  26   BUN  34*   CR  1.22*   GLC  94         Assessment/Plan:   Diagnosis Comments   1. 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 FTT (failure to thrive) in adult   dietary involvement  We will monitor for discharge planning   3. Tardive dyskinesia   chronic  Stable on current regimen; continue medications as currently ordered.   4. Orthostatic hypotension   will continue to monitor while in facility  Stable without medical intervention   5. Adrenal insufficiency (H)   Stable on current regimen; continue medications as currently ordered.   6. Schizophrenia,  unspecified type (H)   Stable on current regimen; continue medications as currently ordered.   7. Physical deconditioning  Continue ambulation program as recommended per PT       Electronically signed by  CARMELLA Rojas CNP

## 2018-04-12 ENCOUNTER — NURSING HOME VISIT (OUTPATIENT)
Dept: GERIATRICS | Facility: CLINIC | Age: 73
End: 2018-04-12

## 2018-04-12 DIAGNOSIS — E27.40 ADRENAL INSUFFICIENCY (H): ICD-10-CM

## 2018-04-12 DIAGNOSIS — G24.01 TARDIVE DYSKINESIA: ICD-10-CM

## 2018-04-12 DIAGNOSIS — R62.7 FTT (FAILURE TO THRIVE) IN ADULT: ICD-10-CM

## 2018-04-12 DIAGNOSIS — D64.89 ANEMIA DUE TO RADIATION: Primary | ICD-10-CM

## 2018-04-12 DIAGNOSIS — R53.81 PHYSICAL DECONDITIONING: ICD-10-CM

## 2018-04-12 DIAGNOSIS — I95.1 ORTHOSTATIC HYPOTENSION: ICD-10-CM

## 2018-04-12 DIAGNOSIS — K92.1 HEMATOCHEZIA: ICD-10-CM

## 2018-04-12 DIAGNOSIS — F20.9 SCHIZOPHRENIA, UNSPECIFIED TYPE (H): ICD-10-CM

## 2018-04-12 PROCEDURE — 99309 SBSQ NF CARE MODERATE MDM 30: CPT | Performed by: NURSE PRACTITIONER

## 2018-04-12 NOTE — LETTER
4/12/2018        RE: Ralph Wilcox  [address] [address]        [place] [facility]    Chief Complaint   Patient presents with     RECHECK       HPI:    Ralph Wilcox is a 72 year old  (1945), who is being seen today for an episodic care visit at [facility].  HPI information obtained from: facility chart records, facility staff and patient report.    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 S/p 3u pRBC on admission. S/p EGD and colonoscopy on 2/13: EGD with non-bleeding small ulcers and colonoscopy with radiation proctopathy, s/p Sigmoidoscopy aid RFA for slow oozing angioectasis. Pt continues to have intermittent bloody stools since the RFA and a steady decline in his hgb to 7.7 and was prophylactically transfused with 1 u of pRBC with appropriate response. On discharge hgb is 9.5. GI contacted one day  prior to discharge about intermitent bloody stools and steady decline in hgb aid suggested slow bleeding is expected and that pt may require out-patient blood transfusions as needed until he follows up with then in 4 weeks for possible Sigmoidoscopy with RFA. On discharge pt tolerating regular diet and no c/a of bloody stools for 24 hrs.  - hgb chick in 1 week at TCU and transfusion of pRBC if '7  - follow up with PCP in 1 week  - follow up with GI in 4 weeks as stated above  Patient underwent flexsig on 3/23 - post-op Hgb assessments note drop in Hgb (expected post-procedure slightly) - it is now stabilizing   Ref. Range 3/23/2018 00:00 3/24/2018 00:00 3/25/2018 00:00 3/27/2018 00:00 04/10/2018  00:00   Hemoglobin Latest Ref Range: 13.4 - 17.5 g/dL 8.5 (L) 8.7 (L) 8.5 (L) 7.7 (L) 8.5 (L)   Patient feeling well today - no c/o  increased fatigue  No active treatment regimen - ongoing monitoring of Hgb with transfusion parameters    FTT (failure to thrive) in adult  Failur? to Thrive: pressented with c/o of inability to independatly preform ADLs, Progressively worsening self cares noted by HHN. Also noted prior  concerns of worsening dementia per chart review. Pt is decisional at this point and would like to return home but amenable to acute rehab. On discharge pt endorsed good appetite and increased mobility.  - could benefit from IT evaluation for dementia by PCP  Currently being discharged to [facility]CU for acute rehab  Weight has remained stable  Wt Readings from Last 4 Encounters:   04/11/18 178 lb 8 oz (81 kg)   04/05/18 178 lb 8 oz (81 kg)   03/22/18 172 lb (78 kg)   03/14/18 172 lb 1.6 oz (78.1 kg)     Tardive dyskinesia  Chronic  Terbinafine restarted inpatient  Patient denies over exaggerated symptoms of tardive dyskinesia since admission with current regimen    Orthostatic hypotension  Blood pressures have remained stable since arrival to facility    Schizophrenia, unspecified type (H)  Chronic  On regimen of Zoloft Seroquel and Klonopin  No behaviors noted since arrival to facility  PHQ9: 08/27  Denies symptoms of depression today  BIMs: 15/15    Adrenal insufficiency (H)  Chronic  Continues on regimen of Florinef    Physical deconditioning  Secondary to hospitalization and above-noted diagnoses    PT discharged patient on 3/19: Discharge summary: patient has been seen for 18 skilled PT treatment sessions. Demonstrates the ability to ambulate independently throughout the facility. Performs transfers and bed mobility independently.  Patient has been provided with a home exercise program. Demonstrates independence and proficiency with home exercise program. Patient will be D/C'd from PT on 03/19/18. Plans for patient to move to an assisted living facility when placement is found.    Nurse manager did requested  "re-evalutation with fatigue/fall 2/2 Hgb drop - completed on 3/26: Patient had a fall associated with anemia. Remains unsteady due to low HGB. Patient will continue with ambulaiton program until anemia can be resolved.     ALLERGIES: No known allergies  Past Medical, Surgical, Family and Social History reviewed and updated in [facility].    Current Outpatient Prescriptions   Medication Sig Dispense Refill     acetaminophen (TYLENOL) 325 MG tablet Take 325-650 mg by mouth every 4 hours as needed       clonazePAM (KLONOPIN) 0.5 MG tablet Take 0.25 mg by mouth At Bedtime       fludrocortisone (FLORINEF) 0.1 MG tablet Take 0.1 mg by mouth daily       gabapentin (NEURONTIN) 600 MG tablet Take 600 mg by mouth At Bedtime       QUEtiapine (SEROQUEL) 100 MG tablet Take 100 mg by mouth At Bedtime       terazosin (HYTRIN) 1 MG capsule Take 1 mg by mouth At Bedtime       vitamin E (TOCOPHEROL) 1000 UNIT capsule Take 1,000 Units by mouth every morning       sertraline (ZOLOFT) 100 MG tablet Take 250 mg by mouth At Bedtime       Medications reviewed:  Medications reconciled to facility chart and changes were made to reflect current medications as identified as above med list. Below are the changes that were made:   Medications stopped since last EPIC medication reconciliation:   There are no discontinued medications.    Medications started since last [facility] medication reconciliation:  No orders of the defined types were placed in this encounter.    REVIEW OF SYSTEMS:  4 point ROS including Respiratory, CV, GI and , other than that noted in the HPI,  is negative    Physical Exam:  /69  Pulse 99  Temp 97.4  F (36.3  C)  Resp 18  Ht 6' 2\" (1.88 m)  Wt 178 lb 8 oz (81 kg)  SpO2 99%  BMI 22.92 kg/m2  BP Readin/91, 124/76, 105/80               HR:  67-99  GENERAL APPEARANCE:  Alert, in no distress, oriented, thin, cooperative, elderly male resting in bed  ENT:  Mouth and posterior oropharynx normal, moist mucous " membranes, [place], edentulous  EYES:  EOM, conjunctivae, lids, pupils and irises normal, wears glasses  NECK:  No adenopathy, masses or thyromegaly  RESP:  Respiratory effort and palpation of chest normal, lungs clear to auscultation, no respiratory distress  CV:  Palpation and auscultation of heart done, regular rate and rhythm, no murmur, rub, or gallop, no edema, +2 pedal pulses  ABDOMEN:  Normal bowel sounds, soft, nontender, no hepatosplenomegaly or other masses  M/S:   Gait and station -slow and steady with use of walker; Digits and nails normal  SKIN:  Palpation of skin and subcutaneous tissue baseline, Inspection of skin and subcutaneous tissue baseline  NEURO:   Cranial nerves 2-12 are normal tested and grossly at patient's baseline  PSYCH:  oriented X 3, insight and judgement impaired, affect and mood normal    Recent Labs:   CBC RESULTS:   Lab Test 4/10/18 04/03/18 03/27/18  03/24/18 03/23/18   WBC  --   --    --    --   7.0  10.1   RBC  --   --    --    --   3.42*  3.47*   HGB  8.5  8.5*  7.7*   < >  8.7*  8.5*   HCT  --   --    --    --   27.0*  27.2*   MCV  --   --    --    --   78.9*  78.4*   RDW  --   --    --    --   17.6*  17.5*   PLT  --   --    --    --   237  266       Last Basic Metabolic Panel:  Recent Labs   Lab Test 03/23/18   NA  141   POTASSIUM  4.2   CHLORIDE  108   NARDA  8.9   CO2  26   BUN  34*   CR  1.22*   GLC  94         Assessment/Plan:   Diagnosis Comments   1. 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 FTT (failure to thrive) in adult   dietary involvement  We will monitor for discharge planning   3. Tardive dyskinesia   chronic  Stable on current regimen; continue medications as currently ordered.   4. Orthostatic hypotension   will continue to monitor while in facility  Stable without medical intervention   5. Adrenal  insufficiency (H)   Stable on current regimen; continue medications as currently ordered.   6. Schizophrenia, unspecified type (H)   Stable on current regimen; continue medications as currently ordered.   7. Physical deconditioning  Continue ambulation program as recommended per PT       Electronically signed by  CARMELLA Rojas CNP      Sincerely,        CARMELLA Rojas CNP

## 2018-04-17 ENCOUNTER — TRANSFERRED RECORDS (OUTPATIENT)
Dept: HEALTH INFORMATION MANAGEMENT | Facility: CLINIC | Age: 73
End: 2018-04-17

## 2018-04-17 LAB — HEMOGLOBIN: 8.7 G/DL (ref 13.4–17.5)

## 2018-04-18 VITALS
OXYGEN SATURATION: 99 % | DIASTOLIC BLOOD PRESSURE: 69 MMHG | SYSTOLIC BLOOD PRESSURE: 157 MMHG | TEMPERATURE: 97.4 F | RESPIRATION RATE: 18 BRPM

## 2018-04-18 NOTE — PROGRESS NOTES
[facility]    Chief Complaint   Patient presents with     RECHECK       HPI:    Ralph Wilcox is a 72 year old  (1945), who is being seen today for an episodic care visit at [facility].  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 S/p 3u pRBC on admission. S/p EGD and colonoscopy on 2/13: EGD with non-bleeding small ulcers and colonoscopy with radiation proctopathy, s/p Sigmoidoscopy aid RFA for slow oozing angioectasis. Pt continues to have intermittent bloody stools since the RFA and a steady decline in his hgb to 7.7 and was prophylactically transfused with 1 u of pRBC with appropriate response. On discharge hgb is 9.5. GI contacted one day  prior to discharge about intermitent bloody stools and steady decline in hgb aid suggested slow bleeding is expected and that pt may require out-patient blood transfusions as needed until he follows up with then in 4 weeks for possible Sigmoidoscopy with RFA. On discharge pt tolerating regular diet and no c/a of bloody stools for 24 hrs.  - hgb chick in 1 week at TCU and transfusion of pRBC if '7  - follow up with PCP in 1 week  - follow up with GI in 4 weeks as stated above  Patient underwent flexsig on 3/23 - post-op Hgb assessments note drop in Hgb (expected post-procedure slightly) - it is now stabilizing  Hemoglobin   Date Value Ref Range Status   04/17/2018 8.7 (A) 13.4 - 17.5 g/dL Final   04/10/2018 8.5 (A) 13.4 - 17.5 g/dL Final   Patient feeling well today - no c/o increased fatigue  No active treatment regimen - ongoing monitoring of Hgb with transfusion parameters    FTT (failure to thrive)  in adult  Failur? to Thrive: pressented with c/o of inability to independatly preform ADLs, Progressively worsening self cares noted by HHN. Also noted prior  concerns of worsening dementia per chart review. Pt is decisional at this point and would like to return home but amenable to acute rehab. On discharge pt endorsed good appetite and increased mobility.  - could benefit from IT evaluation for dementia by PCP  Currently being discharged to [facility] for acute rehab  Weight has remained stable  Wt Readings from Last 4 Encounters:   04/11/18 178 lb 8 oz (81 kg)   04/05/18 178 lb 8 oz (81 kg)   03/22/18 172 lb (78 kg)   03/14/18 172 lb 1.6 oz (78.1 kg)     Tardive dyskinesia  Chronic  Terbinafine restarted inpatient  Patient denies over exaggerated symptoms of tardive dyskinesia since admission with current regimen    Orthostatic hypotension  Blood pressures have remained stable since arrival to facility  BP Readings from Last 6 Encounters:   04/18/18 157/69   04/11/18 157/69   04/05/18 (!) 144/91   03/22/18 (!) 144/91   03/14/18 112/64   03/08/18 108/68     Schizophrenia, unspecified type (H)  Chronic  On regimen of Zoloft Seroquel and Klonopin  No behaviors noted since arrival to facility  PHQ9: 08/27  Denies symptoms of depression today  BIMs: 15/15    Adrenal insufficiency (H)  Chronic  Continues on regimen of Florinef    Tooth pain  Patient noting soreness with R posterior tooth of his upper gum  He has been eating okay but it is causing pain  WBC WNL - Afebrile    Physical deconditioning  Secondary to hospitalization and above-noted diagnoses    PT discharged patient on 3/19: Discharge summary: patient has been seen for 18 skilled PT treatment sessions. Demonstrates the ability to ambulate independently throughout the facility. Performs transfers and bed mobility independently.  Patient has been provided with a home exercise program. Demonstrates independence and proficiency with home exercise  program. Patient will be D/C'd from PT on 03/19/18. Plans for patient to move to an assisted living facility when placement is found.    Nurse manager did requested re-evalutation with fatigue/fall 2/2 Hgb drop - completed on 3/26: Patient had a fall associated with anemia. Remains unsteady due to low HGB. Patient will continue with ambulaiton program until anemia can be resolved.    ALLERGIES: No known allergies  Past Medical, Surgical, Family and Social History reviewed and updated in [facility].    Current Outpatient Prescriptions   Medication Sig Dispense Refill     acetaminophen (TYLENOL) 325 MG tablet Take 325-650 mg by mouth every 4 hours as needed       clonazePAM (KLONOPIN) 0.5 MG tablet Take 0.25 mg by mouth At Bedtime       fludrocortisone (FLORINEF) 0.1 MG tablet Take 0.1 mg by mouth daily       gabapentin (NEURONTIN) 600 MG tablet Take 600 mg by mouth At Bedtime       QUEtiapine (SEROQUEL) 100 MG tablet Take 100 mg by mouth At Bedtime       sertraline (ZOLOFT) 100 MG tablet Take 250 mg by mouth At Bedtime       terazosin (HYTRIN) 1 MG capsule Take 1 mg by mouth At Bedtime       vitamin E (TOCOPHEROL) 1000 UNIT capsule Take 1,000 Units by mouth every morning       Medications reviewed:  Medications reconciled to facility chart and changes were made to reflect current medications as identified as above med list. Below are the changes that were made:   Medications stopped since last EPIC medication reconciliation:   There are no discontinued medications.    Medications started since last [facility] medication reconciliation:  No orders of the defined types were placed in this encounter.    REVIEW OF SYSTEMS:  4 point ROS including Respiratory, CV, GI and , other than that noted in the HPI,  is negative    Physical Exam:  /69  Temp 97.4  F (36.3  C)  Resp 18  SpO2 99%  GENERAL APPEARANCE:  Alert, in no distress, oriented, thin, cooperative, elderly male resting in  - he sits up on the edge of the bed with my  arrival  ENT:  Mouth and posterior oropharynx normal, moist mucous membranes, [place], edentulous  EYES:  EOM, conjunctivae, lids, pupils and irises normal, wears glasses  NECK:  No adenopathy, masses or thyromegaly  RESP:  Respiratory effort and palpation of chest normal, lungs clear to auscultation, no respiratory distress  CV:  Palpation and auscultation of heart done, regular rate and rhythm, no murmur, rub, or gallop, no edema, +2 pedal pulses  ABDOMEN:  Normal bowel sounds, soft, nontender, no hepatosplenomegaly or other masses  M/S:   Gait and station -slow and steady with use of walker; Digits and nails normal  SKIN:  Palpation of skin and subcutaneous tissue baseline, Inspection of skin and subcutaneous tissue baseline  NEURO:   Cranial nerves 2-12 are normal tested and grossly at patient's baseline  PSYCH:  oriented X 3, insight and judgement impaired, affect and mood normal    Recent Labs:   CBC RESULTS:   Recent Labs   Lab Test 04/17/18 04/10/18  03/24/18 03/23/18   WBC   --    --    --   7.0  10.1   RBC   --    --    --   3.42*  3.47*   HGB  8.7*  8.5*   < >  8.7*  8.5*   HCT   --    --    --   27.0*  27.2*   MCV   --    --    --   78.9*  78.4*   RDW   --    --    --   17.6*  17.5*   PLT   --    --    --   237  266    < > = values in this interval not displayed.       Last Basic Metabolic Panel:  Recent Labs   Lab Test 03/23/18   NA  141   POTASSIUM  4.2   CHLORIDE  108   NARDA  8.9   CO2  26   BUN  34*   CR  1.22*   GLC  94         Assessment/Plan:   Diagnosis Comments   1. 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 FTT (failure to thrive) in adult   dietary involvement  We will monitor for discharge planning   3. Tardive dyskinesia   chronic  Stable on current regimen; continue medications as currently ordered.   4. Orthostatic hypotension   will  continue to monitor while in facility  Stable without medical intervention   5. Adrenal insufficiency (H)   Stable on current regimen; continue medications as currently ordered.   6. Schizophrenia, unspecified type (H)   Stable on current regimen; continue medications as currently ordered.   7. Tooth pain Referral to in-house dentist   8. Physical deconditioning  Continue ambulation program as recommended per PT     Electronically signed by  CARMELLA Rojas CNP

## 2018-04-19 ENCOUNTER — NURSING HOME VISIT (OUTPATIENT)
Dept: GERIATRICS | Facility: CLINIC | Age: 73
End: 2018-04-19

## 2018-04-19 DIAGNOSIS — K92.1 HEMATOCHEZIA: ICD-10-CM

## 2018-04-19 DIAGNOSIS — R53.81 PHYSICAL DECONDITIONING: ICD-10-CM

## 2018-04-19 DIAGNOSIS — R62.7 FTT (FAILURE TO THRIVE) IN ADULT: ICD-10-CM

## 2018-04-19 DIAGNOSIS — D64.89 ANEMIA DUE TO RADIATION: Primary | ICD-10-CM

## 2018-04-19 DIAGNOSIS — G24.01 TARDIVE DYSKINESIA: ICD-10-CM

## 2018-04-19 DIAGNOSIS — E27.40 ADRENAL INSUFFICIENCY (H): ICD-10-CM

## 2018-04-19 DIAGNOSIS — F20.9 SCHIZOPHRENIA, UNSPECIFIED TYPE (H): ICD-10-CM

## 2018-04-19 DIAGNOSIS — I95.1 ORTHOSTATIC HYPOTENSION: ICD-10-CM

## 2018-04-19 DIAGNOSIS — K08.89 TOOTH PAIN: ICD-10-CM

## 2018-04-19 PROCEDURE — 99309 SBSQ NF CARE MODERATE MDM 30: CPT | Performed by: NURSE PRACTITIONER

## 2018-04-19 NOTE — LETTER
4/19/2018        RE: Ralph Wilcox  [address] [id number]        [facility]    Chief Complaint   Patient presents with     RECHECK       HPI:    Ralph Wilcox is a 72 year old  (1945), who is being seen today for an episodic care visit [facility].  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 S/p 3u pRBC on admission. S/p EGD and colonoscopy on 2/13: EGD with non-bleeding small ulcers and colonoscopy with radiation proctopathy, s/p Sigmoidoscopy aid RFA for slow oozing angioectasis. Pt continues to have intermittent bloody stools since the RFA and a steady decline in his hgb to 7.7 and was prophylactically transfused with 1 u of pRBC with appropriate response. On discharge hgb is 9.5. GI contacted one day  prior to discharge about intermitent bloody stools and steady decline in hgb aid suggested slow bleeding is expected and that pt may require out-patient blood transfusions as needed until he follows up with then in 4 weeks for possible Sigmoidoscopy with RFA. On discharge pt tolerating regular diet and no c/a of bloody stools for 24 hrs.  - hgb chick in 1 week at TCU and transfusion of pRBC if '7  - follow up with PCP in 1 week  - follow up with GI in 4 weeks as stated above  Patient underwent flexsig on 3/23 - post-op Hgb assessments note drop in Hgb (expected post-procedure slightly) - it is now stabilizing  Hemoglobin   Date Value Ref Range Status   04/17/2018 8.7 (A) 13.4 - 17.5 g/dL Final   04/10/2018 8.5 (A) 13.4 - 17.5 g/dL Final   Patient feeling well today - no c/o increased fatigue  No active  treatment regimen - ongoing monitoring of Hgb with transfusion parameters    FTT (failure to thrive) in adult  Failur? to Thrive: pressented with c/o of inability to independatly preform ADLs, Progressively worsening self cares noted by HHN. Also noted prior  concerns of worsening dementia per chart review. Pt is decisional at this point and would like to return home but amenable to acute rehab. On discharge pt endorsed good appetite and increased mobility.  - could benefit from IT evaluation for dementia by PCP  [facility]  Weight has remained stable  Wt Readings from Last 4 Encounters:   04/11/18 178 lb 8 oz (81 kg)   04/05/18 178 lb 8 oz (81 kg)   03/22/18 172 lb (78 kg)   03/14/18 172 lb 1.6 oz (78.1 kg)     Tardive dyskinesia  Chronic  Terbinafine restarted inpatient  Patient denies over exaggerated symptoms of tardive dyskinesia since admission with current regimen    Orthostatic hypotension  Blood pressures have remained stable since arrival to facility  BP Readings from Last 6 Encounters:   04/18/18 157/69   04/11/18 157/69   04/05/18 (!) 144/91   03/22/18 (!) 144/91   03/14/18 112/64   03/08/18 108/68     Schizophrenia, unspecified type (H)  Chronic  On regimen of Zoloft Seroquel and Klonopin  No behaviors noted since arrival to facility  PHQ9: 08/27  Denies symptoms of depression today  BIMs: 15/15    Adrenal insufficiency (H)  Chronic  Continues on regimen of Florinef    Tooth pain  Patient noting soreness with R posterior tooth of his upper gum  He has been eating okay but it is causing pain  WBC WNL - Afebrile    Physical deconditioning  Secondary to hospitalization and above-noted diagnoses    PT discharged patient on 3/19: Discharge summary: patient has been seen for 18 skilled PT treatment sessions. Demonstrates the ability to ambulate independently throughout the facility. Performs transfers and bed mobility independently.  Patient has been  provided with a home exercise program. Demonstrates independence and proficiency with home exercise program. Patient will be D/C'd from PT on 03/19/18. Plans for patient to move to an assisted living facility when placement is found.    Nurse manager did requested re-evalutation with fatigue/fall 2/2 Hgb drop - completed on 3/26: Patient had a fall associated with anemia. Remains unsteady due to low HGB. Patient will continue with ambulaiton program until anemia can be resolved.    ALLERGIES: No known allergies  Past Medical, Surgical, Family and Social History reviewed and updated in [facility].    Current Outpatient Prescriptions   Medication Sig Dispense Refill     acetaminophen (TYLENOL) 325 MG tablet Take 325-650 mg by mouth every 4 hours as needed       clonazePAM (KLONOPIN) 0.5 MG tablet Take 0.25 mg by mouth At Bedtime       fludrocortisone (FLORINEF) 0.1 MG tablet Take 0.1 mg by mouth daily       gabapentin (NEURONTIN) 600 MG tablet Take 600 mg by mouth At Bedtime       QUEtiapine (SEROQUEL) 100 MG tablet Take 100 mg by mouth At Bedtime       sertraline (ZOLOFT) 100 MG tablet Take 250 mg by mouth At Bedtime       terazosin (HYTRIN) 1 MG capsule Take 1 mg by mouth At Bedtime       vitamin E (TOCOPHEROL) 1000 UNIT capsule Take 1,000 Units by mouth every morning       Medications reviewed:  Medications reconciled to facility chart and changes were made to reflect current medications as identified as above med list. Below are the changes that were made:   Medications stopped since last EPIC medication reconciliation:   There are no discontinued medications.    Medications started since last [facility] medication reconciliation:  No orders of the defined types were placed in this encounter.    REVIEW OF SYSTEMS:  4 point ROS including Respiratory, CV, GI and , other than that noted in the HPI,  is negative    Physical Exam:  /69  Temp 97.4  F (36.3  C)  Resp 18  SpO2 99%  GENERAL APPEARANCE:  Alert, in no  distress, oriented, thin, cooperative, elderly male resting in  - he sits up on the edge of the bed with my arrival  ENT:  Mouth and posterior oropharynx normal, moist mucous membranes, [place], edentulous  EYES:  EOM, conjunctivae, lids, pupils and irises normal, wears glasses  NECK:  No adenopathy, masses or thyromegaly  RESP:  Respiratory effort and palpation of chest normal, lungs clear to auscultation, no respiratory distress  CV:  Palpation and auscultation of heart done, regular rate and rhythm, no murmur, rub, or gallop, no edema, +2 pedal pulses  ABDOMEN:  Normal bowel sounds, soft, nontender, no hepatosplenomegaly or other masses  M/S:   Gait and station -slow and steady with use of walker; Digits and nails normal  SKIN:  Palpation of skin and subcutaneous tissue baseline, Inspection of skin and subcutaneous tissue baseline  NEURO:   Cranial nerves 2-12 are normal tested and grossly at patient's baseline  PSYCH:  oriented X 3, insight and judgement impaired, affect and mood normal    Recent Labs:   CBC RESULTS:   Recent Labs   Lab Test 04/17/18 04/10/18  03/24/18 03/23/18   WBC   --    --    --   7.0  10.1   RBC   --    --    --   3.42*  3.47*   HGB  8.7*  8.5*   < >  8.7*  8.5*   HCT   --    --    --   27.0*  27.2*   MCV   --    --    --   78.9*  78.4*   RDW   --    --    --   17.6*  17.5*   PLT   --    --    --   237  266    < > = values in this interval not displayed.       Last Basic Metabolic Panel:  Recent Labs   Lab Test 03/23/18   NA  141   POTASSIUM  4.2   CHLORIDE  108   NARDA  8.9   CO2  26   BUN  34*   CR  1.22*   GLC  94         Assessment/Plan:   Diagnosis Comments   1. 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 FTT (failure to thrive) in adult   dietary involvement  We will monitor for discharge planning   3. Tardive dyskinesia    chronic  Stable on current regimen; continue medications as currently ordered.   4. Orthostatic hypotension   will continue to monitor while in facility  Stable without medical intervention   5. Adrenal insufficiency (H)   Stable on current regimen; continue medications as currently ordered.   6. Schizophrenia, unspecified type (H)   Stable on current regimen; continue medications as currently ordered.   7. Tooth pain Referral to in-house dentist   8. Physical deconditioning  Continue ambulation program as recommended per PT     Electronically signed by  CARMELLA Rojas CNP      Sincerely,        CARMELLA Rojas CNP

## 2018-04-24 ENCOUNTER — TRANSFERRED RECORDS (OUTPATIENT)
Dept: HEALTH INFORMATION MANAGEMENT | Facility: CLINIC | Age: 73
End: 2018-04-24

## 2018-04-24 PROBLEM — K08.89 TOOTH PAIN: Status: ACTIVE | Noted: 2018-04-24

## 2018-04-24 LAB — HEMOGLOBIN: 9 G/DL (ref 13.4–17.5)

## 2018-04-26 ENCOUNTER — DISCHARGE SUMMARY NURSING HOME (OUTPATIENT)
Dept: GERIATRICS | Facility: CLINIC | Age: 73
End: 2018-04-26

## 2018-04-26 VITALS
DIASTOLIC BLOOD PRESSURE: 67 MMHG | BODY MASS INDEX: 22.91 KG/M2 | SYSTOLIC BLOOD PRESSURE: 111 MMHG | HEIGHT: 74 IN | RESPIRATION RATE: 18 BRPM | HEART RATE: 84 BPM | OXYGEN SATURATION: 96 % | WEIGHT: 178.5 LBS | TEMPERATURE: 99.9 F

## 2018-04-26 DIAGNOSIS — D64.89 ANEMIA DUE TO RADIATION: Primary | ICD-10-CM

## 2018-04-26 DIAGNOSIS — R62.7 FTT (FAILURE TO THRIVE) IN ADULT: ICD-10-CM

## 2018-04-26 DIAGNOSIS — I95.1 ORTHOSTATIC HYPOTENSION: ICD-10-CM

## 2018-04-26 DIAGNOSIS — E27.40 ADRENAL INSUFFICIENCY (H): ICD-10-CM

## 2018-04-26 DIAGNOSIS — F20.9 SCHIZOPHRENIA, UNSPECIFIED TYPE (H): ICD-10-CM

## 2018-04-26 DIAGNOSIS — G24.01 TARDIVE DYSKINESIA: ICD-10-CM

## 2018-04-26 DIAGNOSIS — K08.89 TOOTH PAIN: ICD-10-CM

## 2018-04-26 DIAGNOSIS — K92.1 HEMATOCHEZIA: ICD-10-CM

## 2018-04-26 DIAGNOSIS — R53.81 PHYSICAL DECONDITIONING: ICD-10-CM

## 2018-04-26 PROCEDURE — 99316 NF DSCHRG MGMT 30 MIN+: CPT | Performed by: NURSE PRACTITIONER

## 2018-04-26 NOTE — PROGRESS NOTES
[place] [facility] DISCHARGE SUMMARY    PATIENT'S NAME: Ralph Wilcox  YOB: 1945  MEDICAL RECORD NUMBER:  [id number]    PRIMARY CARE PROVIDER AND CLINIC RESPONSIBLE AFTER TRANSFER: No Ref-Primary, Physician      CODE STATUS/ADVANCE DIRECTIVES DISCUSSION:   DNR / DNI       Allergies   Allergen Reactions     No Known Allergies        TRANSFERRING PROVIDERS: CARMELLA Rojas CNP, Stephanie Coronel MD  DATE OF SNF ADMISSION:  February / 21 / 2018  DATE OF SNF (anticipated) DISCHARGE: May / 01 / 2016 (Tentative date)  DISCHARGE DISPOSITION: Home   Nursing Facility: [facility] stay 02/10/2018 to 02/21/2018.     Condition on Discharge:  Improving.  Function:  Independent with ADLs and ambulation - Therapy D/c notes as below  Cognitive Scores: BIMS 14/15, CPT 5/6 and MMSE 24/30    Equipment: no aids    DISCHARGE DIAGNOSIS:   1. 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 Hematochezia    3. FTT (failure to thrive) in adult    4. Tardive dyskinesia    5. Orthostatic hypotension    6. Schizophrenia, unspecified type (H)    7. Adrenal insufficiency (H)    8. Tooth pain    9. Physical deconditioning      HPI Nursing Facility Course:  HPI information obtained from: facility chart records, facility staff, patient report and [facility] chart review.  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 S/p 3u pRBC on admission. S/p EGD and colonoscopy on 2/13: EGD with non-bleeding small ulcers and colonoscopy with radiation proctopathy, s/p Sigmoidoscopy aid RFA for slow oozing angioectasis. Pt continues to have intermittent bloody stools since the RFA and a steady decline in his hgb to 7.7 and was prophylactically transfused with 1 u of pRBC with  appropriate response. On discharge hgb is 9.5. GI contacted one day  prior to discharge about intermitent bloody stools and steady decline in hgb aid suggested slow bleeding is expected and that pt may require out-patient blood transfusions as needed until he follows up with then in 4 weeks for possible Sigmoidoscopy with RFA. On discharge pt tolerating regular diet and no c/a of bloody stools for 24 hrs.  - hgb chick in 1 week at TCU and transfusion of pRBC if '7  - follow up with PCP in 1 week  - follow up with GI in 4 weeks as stated above  Patient underwent flexsig on 3/23 - post-op Hgb assessments note drop in Hgb (expected post-procedure slightly) - it is now stabilizing  Hemoglobin   Date Value Ref Range Status   04/17/2018 8.7 (A) 13.4 - 17.5 g/dL Final   04/10/2018 8.5 (A) 13.4 - 17.5 g/dL Final   Patient feeling well today - no c/o increased fatigue  No active treatment regimen - ongoing monitoring of Hgb with transfusion parameters  Stable without medical intervention - improving slowly  F/u with PCP for ongoing monitoring and future interventions    FTT (failure to thrive) in adult  Failur? to Thrive: pressented with c/o of inability to independatly preform ADLs, Progressively worsening self cares noted by HHN. Also noted prior  concerns of worsening dementia per chart review. Pt is decisional at this point and would like to return home but amenable to acute rehab. On discharge pt endorsed good appetite and increased mobility.  - could benefit from IT evaluation for dementia by PCP  Currently being discharged to [facility]CU for acute rehab  Weight has remained stable  Wt Readings from Last 4 Encounters:   04/26/18 178 lb 8 oz (81 kg)   04/11/18 178 lb 8 oz (81 kg)   04/05/18 178 lb 8 oz (81 kg)   03/22/18 172 lb (78 kg)   F/u with PCP for continued monitoring and ongoing interventions    Tardive dyskinesia  Chronic  Terbinafine restarted inpatient  Patient denies over exaggerated symptoms of  tardive dyskinesia since admission with current regimen  Stable on current regimen; continue medications as currently ordered.  F/u with PCP/Psych for ongoing monitoring and management    Orthostatic hypotension  BP Reading:                 HR:     118/61  116/70  157/69  Blood pressures have remained stable since arrival to facility  BP Readings from Last 6 Encounters:   04/26/18 111/67   04/18/18 157/69   04/11/18 157/69   04/05/18 (!) 144/91   03/22/18 (!) 144/91   03/14/18 112/64   Stable without medical intervention  F/u with PCP for ongoing monitoring and interventions as needed    Schizophrenia, unspecified type (H)  Chronic  On regimen of Zoloft Seroquel and Klonopin  No behaviors noted since arrival to facility  PHQ9: 08/27  Denies symptoms of depression today  BIMs: 14/15  Stable on current regimen; continue medications as currently ordered.  F/u with PCP for ongoing monitoring and management    Adrenal insufficiency (H)  Chronic  Continues on regimen of Florinef  Stable on current regimen; continue medications as currently ordered.  F/u with PCP/Endocrine for ongoing monitoring and management    Tooth pain  Patient noting soreness with R posterior tooth of his upper gum  He has been eating okay but it is causing pain  WBC WNL - Afebrile  Referral given to in-house Dentist  Patient will need to f/u with Dentist following D/c as needed if not seen while in TCU    Physical deconditioning  Secondary to hospitalization and above-noted diagnoses    PT discharged patient on 3/19: Discharge summary: patient has been seen for 18 skilled PT treatment sessions. Demonstrates the ability to ambulate independently throughout the facility. Performs transfers and bed mobility independently.  Patient has been provided with a home exercise program. Demonstrates independence and proficiency with home exercise program. Patient will be D/C'd from PT on 03/19/18. Plans for patient to move to an assisted living facility  "when placement is found.    Nurse manager did requested re-evalutation with fatigue/fall 2/2 Hgb drop - completed on 3/26: Patient had a fall associated with anemia. Remains unsteady due to low HGB. Patient will continue with ambulaiton program until anemia can be resolved    PAST MEDICAL HISTORY:  has no past medical history on file.    DISCHARGE MEDICATIONS:  Current Outpatient Prescriptions   Medication Sig Dispense Refill     acetaminophen (TYLENOL) 325 MG tablet Take 325-650 mg by mouth every 4 hours as needed       clonazePAM (KLONOPIN) 0.5 MG tablet Take 0.25 mg by mouth At Bedtime       fludrocortisone (FLORINEF) 0.1 MG tablet Take 0.1 mg by mouth daily       gabapentin (NEURONTIN) 600 MG tablet Take 600 mg by mouth At Bedtime       QUEtiapine (SEROQUEL) 100 MG tablet Take 100 mg by mouth At Bedtime       sertraline (ZOLOFT) 100 MG tablet Take 250 mg by mouth At Bedtime       terazosin (HYTRIN) 1 MG capsule Take 1 mg by mouth At Bedtime       vitamin E (TOCOPHEROL) 1000 UNIT capsule Take 1,000 Units by mouth every morning         MEDICATION CHANGES/RATIONALE:   No medication changes while in facility  Controlled medications sent with patient:   not applicable/none     ROS:    10 point ROS of systems including Constitutional, Eyes, Respiratory, Cardiovascular, Gastroenterology, Genitourinary, Integumentary, Muscularskeletal, Psychiatric were all negative except for pertinent positives noted in my HPI.    Physical Exam:   Vitals: /67  Pulse 84  Temp 99.9  F (37.7  C)  Resp 18  Ht 6' 2\" (1.88 m)  Wt 178 lb 8 oz (81 kg)  SpO2 96%  BMI 22.92 kg/m2  BMI= Body mass index is 22.92 kg/(m^2).  GENERAL APPEARANCE:  Alert, in no distress, oriented, thin, cooperative, elderly male resting in bed  ENT:  Mouth and posterior oropharynx normal, moist mucous membranes, [place], edentulous  EYES:  EOM, conjunctivae, lids, pupils and irises normal, wears glasses  NECK:  No adenopathy, masses or thyromegaly  RESP:  " Respiratory effort and palpation of chest normal, lungs clear to auscultation, no respiratory distress  CV:  Palpation and auscultation of heart done, regular rate and rhythm, no murmur, rub, or gallop, no edema, +2 pedal pulses  ABDOMEN:  Normal bowel sounds, soft, nontender, no hepatosplenomegaly or other masses  M/S:   Gait and station -slow and steady with use of walker; Digits and nails normal  SKIN:  Palpation of skin and subcutaneous tissue baseline, Inspection of skin and subcutaneous tissue baseline  NEURO:   Cranial nerves 2-12 are normal tested and grossly at patient's baseline  PSYCH:  oriented X 3, insight and judgement impaired, affect and mood normal     DISCHARGE PLAN:  Occupational Therapy, Physical Therapy and From:  Home care of choice  Patient instructed to follow-up with:  PCP in 7-10 days       [facility] scheduled appointments: None  MTM referral needed and placed by this provider: No    Pending labs: CBC/BMP 4/27  [facility] labs   CBC RESULTS:    Recent Labs   Lab Test 04/24/18 04/17/18 04/10/18  03/24/18 03/23/18   WBC    --    --    --   7.0  10.1   RBC    --    --    --   3.42*  3.47*   HGB 9.0*  8.7*  8.5*   < >  8.7*  8.5*   HCT    --    --    --   27.0*  27.2*   MCV    --    --    --   78.9*  78.4*   RDW    --    --    --   17.6*  17.5*   PLT    --    --    --   237  266     < > = values in this interval not displayed.       Last Basic Metabolic Panel:  Recent Labs   Lab Test 03/23/18   NA  141   POTASSIUM  4.2   CHLORIDE  108   NARDA  8.9   CO2  26   BUN  34*   CR  1.22*   GLC  94         Discharge Treatments: None    TOTAL DISCHARGE TIME:   Greater than 30 minutes  Electronically signed by:  CARMELLA Rojas CNP

## 2018-04-26 NOTE — LETTER
4/26/2018        RE: Ralph Wilcox  [address]  [facility] [address]          [place] [facility] DISCHARGE SUMMARY    PATIENT'S NAME: Ralph Wilcox  YOB: 1945  MEDICAL RECORD NUMBER:  [id number]    PRIMARY CARE PROVIDER AND CLINIC RESPONSIBLE AFTER TRANSFER: No Ref-Primary, Physician      CODE STATUS/ADVANCE DIRECTIVES DISCUSSION:   DNR / DNI       Allergies   Allergen Reactions     No Known Allergies        TRANSFERRING PROVIDERS: CARMELLA Rojas CNP, Stephanie Coronel MD  DATE OF SNF ADMISSION:  February / 21 / 2018  DATE OF SNF (anticipated) DISCHARGE: May / 01 / 2016 (Tentative date)  DISCHARGE DISPOSITION: Home   Nursing Facility: [facility] stay 02/10/2018 to 02/21/2018.     Condition on Discharge:  Improving.  Function:  Independent with ADLs and ambulation - Therapy D/c notes as below  Cognitive Scores: BIMS 14/15, CPT 5/6 and MMSE 24/30    Equipment: no aids    DISCHARGE DIAGNOSIS:   1. 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 Hematochezia    3. FTT (failure to thrive) in adult    4. Tardive dyskinesia    5. Orthostatic hypotension    6. Schizophrenia, unspecified type (H)    7. Adrenal insufficiency (H)    8. Tooth pain    9. Physical deconditioning      HPI Nursing Facility Course:  HPI information obtained from: facility chart records, facility staff, patient report and [facility] chart review.  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 S/p 3u pRBC on admission. S/p EGD and colonoscopy on 2/13: EGD with non-bleeding small ulcers and colonoscopy with radiation proctopathy, s/p Sigmoidoscopy aid RFA for slow oozing angioectasis. Pt continues to have intermittent bloody stools since the RFA and a  steady decline in his hgb to 7.7 and was prophylactically transfused with 1 u of pRBC with appropriate response. On discharge hgb is 9.5. GI contacted one day  prior to discharge about intermitent bloody stools and steady decline in hgb aid suggested slow bleeding is expected and that pt may require out-patient blood transfusions as needed until he follows up with then in 4 weeks for possible Sigmoidoscopy with RFA. On discharge pt tolerating regular diet and no c/a of bloody stools for 24 hrs.  - hgb chick in 1 week at TCU and transfusion of pRBC if '7  - follow up with PCP in 1 week  - follow up with GI in 4 weeks as stated above  Patient underwent flexsig on 3/23 - post-op Hgb assessments note drop in Hgb (expected post-procedure slightly) - it is now stabilizing  Hemoglobin   Date Value Ref Range Status   04/17/2018 8.7 (A) 13.4 - 17.5 g/dL Final   04/10/2018 8.5 (A) 13.4 - 17.5 g/dL Final   Patient feeling well today - no c/o increased fatigue  No active treatment regimen - ongoing monitoring of Hgb with transfusion parameters  Stable without medical intervention - improving slowly  F/u with PCP for ongoing monitoring and future interventions    FTT (failure to thrive) in adult  Failur? to Thrive: pressented with c/o of inability to independatly preform ADLs, Progressively worsening self cares noted by HHN. Also noted prior  concerns of worsening dementia per chart review. Pt is decisional at this point and would like to return home but amenable to acute rehab. On discharge pt endorsed good appetite and increased mobility.  - could benefit from IT evaluation for dementia by PCP  [facility]  Weight has remained stable  Wt Readings from Last 4 Encounters:   04/26/18 178 lb 8 oz (81 kg)   04/11/18 178 lb 8 oz (81 kg)   04/05/18 178 lb 8 oz (81 kg)   03/22/18 172 lb (78 kg)   F/u with PCP for continued monitoring and ongoing interventions    Tardive  dyskinesia  Chronic  Terbinafine restarted inpatient  Patient denies over exaggerated symptoms of tardive dyskinesia since admission with current regimen  Stable on current regimen; continue medications as currently ordered.  F/u with PCP/Psych for ongoing monitoring and management    Orthostatic hypotension  BP Reading:                 HR:     118/61  116/70  157/69  Blood pressures have remained stable since arrival to facility  BP Readings from Last 6 Encounters:   04/26/18 111/67   04/18/18 157/69   04/11/18 157/69   04/05/18 (!) 144/91   03/22/18 (!) 144/91   03/14/18 112/64   Stable without medical intervention  F/u with PCP for ongoing monitoring and interventions as needed    Schizophrenia, unspecified type (H)  Chronic  On regimen of Zoloft Seroquel and Klonopin  No behaviors noted since arrival to facility  PHQ9: 08/27  Denies symptoms of depression today  BIMs: 14/15  Stable on current regimen; continue medications as currently ordered.  F/u with PCP for ongoing monitoring and management    Adrenal insufficiency (H)  Chronic  Continues on regimen of Florinef  Stable on current regimen; continue medications as currently ordered.  F/u with PCP/Endocrine for ongoing monitoring and management    Tooth pain  Patient noting soreness with R posterior tooth of his upper gum  He has been eating okay but it is causing pain  WBC WNL - Afebrile  Referral given to in-house Dentist  Patient will need to f/u with Dentist following D/c as needed if not seen while in TCU    Physical deconditioning  Secondary to hospitalization and above-noted diagnoses    PT discharged patient on 3/19: Discharge summary: patient has been seen for 18 skilled PT treatment sessions. Demonstrates the ability to ambulate independently throughout the facility. Performs transfers and bed mobility independently.  Patient has been provided with a home exercise program. Demonstrates independence and proficiency with home exercise program.  "Patient will be D/C'd from PT on 03/19/18. Plans for patient to move to an assisted living facility when placement is found.    Nurse manager did requested re-evalutation with fatigue/fall 2/2 Hgb drop - completed on 3/26: Patient had a fall associated with anemia. Remains unsteady due to low HGB. Patient will continue with ambulaiton program until anemia can be resolved    PAST MEDICAL HISTORY:  has no past medical history on file.    DISCHARGE MEDICATIONS:  Current Outpatient Prescriptions   Medication Sig Dispense Refill     acetaminophen (TYLENOL) 325 MG tablet Take 325-650 mg by mouth every 4 hours as needed       clonazePAM (KLONOPIN) 0.5 MG tablet Take 0.25 mg by mouth At Bedtime       fludrocortisone (FLORINEF) 0.1 MG tablet Take 0.1 mg by mouth daily       gabapentin (NEURONTIN) 600 MG tablet Take 600 mg by mouth At Bedtime       QUEtiapine (SEROQUEL) 100 MG tablet Take 100 mg by mouth At Bedtime       sertraline (ZOLOFT) 100 MG tablet Take 250 mg by mouth At Bedtime       terazosin (HYTRIN) 1 MG capsule Take 1 mg by mouth At Bedtime       vitamin E (TOCOPHEROL) 1000 UNIT capsule Take 1,000 Units by mouth every morning         MEDICATION CHANGES/RATIONALE:   No medication changes while in facility  Controlled medications sent with patient:   not applicable/none     ROS:    10 point ROS of systems including Constitutional, Eyes, Respiratory, Cardiovascular, Gastroenterology, Genitourinary, Integumentary, Muscularskeletal, Psychiatric were all negative except for pertinent positives noted in my HPI.    Physical Exam:   Vitals: /67  Pulse 84  Temp 99.9  F (37.7  C)  Resp 18  Ht 6' 2\" (1.88 m)  Wt 178 lb 8 oz (81 kg)  SpO2 96%  BMI 22.92 kg/m2  BMI= Body mass index is 22.92 kg/(m^2).  GENERAL APPEARANCE:  Alert, in no distress, oriented, thin, cooperative, elderly male resting in bed  ENT:  Mouth and posterior oropharynx normal, moist mucous membranes, [place], edentulous  EYES:  EOM, conjunctivae, " lids, pupils and irises normal, wears glasses  NECK:  No adenopathy, masses or thyromegaly  RESP:  Respiratory effort and palpation of chest normal, lungs clear to auscultation, no respiratory distress  CV:  Palpation and auscultation of heart done, regular rate and rhythm, no murmur, rub, or gallop, no edema, +2 pedal pulses  ABDOMEN:  Normal bowel sounds, soft, nontender, no hepatosplenomegaly or other masses  M/S:   Gait and station -slow and steady with use of walker; Digits and nails normal  SKIN:  Palpation of skin and subcutaneous tissue baseline, Inspection of skin and subcutaneous tissue baseline  NEURO:   Cranial nerves 2-12 are normal tested and grossly at patient's baseline  PSYCH:  oriented X 3, insight and judgement impaired, affect and mood normal     DISCHARGE PLAN:  Occupational Therapy, Physical Therapy and From:  Home care of choice  Patient instructed to follow-up with:  PCP in 7-10 days       [facility] scheduled appointments: None  MTM referral needed and placed by this provider: No    Pending labs: CBC/BMP 4/27  [facility] labs   CBC RESULTS:    Recent Labs   Lab Test 04/24/18 04/17/18 04/10/18  03/24/18 03/23/18   WBC    --    --    --   7.0  10.1   RBC    --    --    --   3.42*  3.47*   HGB 9.0*  8.7*  8.5*   < >  8.7*  8.5*   HCT    --    --    --   27.0*  27.2*   MCV    --    --    --   78.9*  78.4*   RDW    --    --    --   17.6*  17.5*   PLT    --    --    --   237  266     < > = values in this interval not displayed.       Last Basic Metabolic Panel:  Recent Labs   Lab Test 03/23/18   NA  141   POTASSIUM  4.2   CHLORIDE  108   NARDA  8.9   CO2  26   BUN  34*   CR  1.22*   GLC  94         Discharge Treatments: None    TOTAL DISCHARGE TIME:   Greater than 30 minutes  Electronically signed by:  CARMELLA Rojas CNP        Documentation of Face-to-Face and Certification for Home Health Services     Patient: Ralph Wilcox   YOB: 1945  MR Number: [id number]  Today's  Date: Apr 26, 2018    I certify that patient: Ralph Wilcox is under my care and that I, or a nurse practitioner or physician's assistant working with me, had a face-to-face encounter that meets the physician face-to-face encounter requirements with this patient on: 4/26/2018.    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 Diagnoses of Hematochezia, FTT (failure to thrive) in adult, Tardive dyskinesia, Orthostatic hypotension, Schizophrenia, unspecified type (H), Adrenal insufficiency (H), Tooth pain, and Physical deconditioning were also pertinent to this visit.    I certify that, based on my findings, the following services are medically necessary home health services: Occupational Therapy and Physical Therapy.    My clinical findings support the need for the above services because: Occupational Therapy Services are needed to assess and treat cognitive ability and address ADL safety due to impairment in cognitive ability and transitioning back into community setting. and Physical Therapy Services are needed to assess and treat the following functional impairments: physical deconditioning 2/2 above noted diagnoses.    Further, I certify that my clinical findings support that this patient is homebound (i.e. absences from home require considerable and taxing effort and are for medical reasons or [employer] services or infrequently or of short duration when for other reasons) because: Mental health symptoms including: Mental health condition is exacerbated by exposure to crowds, unfamiliar environment or new stressful situations...    Based on the above findings. I certify that this patient is confined to the home and needs intermittent skilled nursing care, physical therapy and/or speech therapy.  The patient is under my care, and I have initiated the  establishment of the plan of care.  This patient will be followed by a physician who will periodically review the plan of care.  Physician/Provider to provide follow up care: No Ref-Primary, Physician    Responsible Medicare certified [employer] Physician: Dr. Stephanie Coronel MD  Physician Signature: See electronic signature associated with these discharge orders.  Date: Apr 26, 2018      Sincerely,        CARMELLA Rojas CNP

## 2018-05-01 NOTE — PROGRESS NOTES
Documentation of Face-to-Face and Certification for Home Health Services     Patient: Ralph Wilcox   YOB: 1945  MR Number: [id number]  Today's Date: Apr 26, 2018    I certify that patient: Ralph Wlicox is under my care and that I, or a nurse practitioner or physician's assistant working with me, had a face-to-face encounter that meets the physician face-to-face encounter requirements with this patient on: 4/26/2018.    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 Diagnoses of Hematochezia, FTT (failure to thrive) in adult, Tardive dyskinesia, Orthostatic hypotension, Schizophrenia, unspecified type (H), Adrenal insufficiency (H), Tooth pain, and Physical deconditioning were also pertinent to this visit.    I certify that, based on my findings, the following services are medically necessary home health services: Occupational Therapy and Physical Therapy.    My clinical findings support the need for the above services because: Occupational Therapy Services are needed to assess and treat cognitive ability and address ADL safety due to impairment in cognitive ability and transitioning back into community setting. and Physical Therapy Services are needed to assess and treat the following functional impairments: physical deconditioning 2/2 above noted diagnoses.    Further, I certify that my clinical findings support that this patient is homebound (i.e. absences from home require considerable and taxing effort and are for medical reasons or [facility] services or infrequently or of short duration when for other reasons) because: Mental health symptoms including: Mental health condition is exacerbated by exposure to crowds, unfamiliar environment or new stressful situations...    Based on the above findings. I certify that this patient  is confined to the home and needs intermittent skilled nursing care, physical therapy and/or speech therapy.  The patient is under my care, and I have initiated the establishment of the plan of care.  This patient will be followed by a physician who will periodically review the plan of care.  Physician/Provider to provide follow up care: No Ref-Primary, Physician    Responsible Medicare certified [employer] Physician: Dr. Stephanie Coronel MD  Physician Signature: See electronic signature associated with these discharge orders.  Date: Apr 26, 2018

## 2021-03-18 ENCOUNTER — TELEPHONE (OUTPATIENT)
Dept: INTERNAL MEDICINE | Facility: CLINIC | Age: 76
End: 2021-03-18